# Patient Record
Sex: FEMALE | Race: WHITE | NOT HISPANIC OR LATINO | Employment: FULL TIME | URBAN - METROPOLITAN AREA
[De-identification: names, ages, dates, MRNs, and addresses within clinical notes are randomized per-mention and may not be internally consistent; named-entity substitution may affect disease eponyms.]

---

## 2017-05-19 ENCOUNTER — HOSPITAL ENCOUNTER (OUTPATIENT)
Dept: FAMILY MEDICINE CLINIC | Facility: CLINIC | Age: 67
Setting detail: SPECIMEN
Discharge: HOME | End: 2017-05-19
Attending: FAMILY MEDICINE | Admitting: FAMILY MEDICINE

## 2017-05-19 ENCOUNTER — CONVERSION ENCOUNTER (OUTPATIENT)
Dept: FAMILY MEDICINE CLINIC | Facility: CLINIC | Age: 67
End: 2017-05-19

## 2017-05-19 LAB
ALBUMIN SERPL-MCNC: 4.1 G/DL (ref 3.5–4.8)
ALBUMIN/GLOB SERPL: 1.2 {RATIO} (ref 1–1.7)
ALP SERPL-CCNC: 66 IU/L (ref 32–91)
ALT SERPL-CCNC: 33 IU/L (ref 14–54)
ANION GAP SERPL CALC-SCNC: 13.1 MMOL/L (ref 10–20)
AST SERPL-CCNC: 28 IU/L (ref 15–41)
BILIRUB SERPL-MCNC: 0.5 MG/DL (ref 0.3–1.2)
BUN SERPL-MCNC: 18 MG/DL (ref 8–20)
BUN/CREAT SERPL: 22.5 (ref 5.4–26.2)
CALCIUM SERPL-MCNC: 9.4 MG/DL (ref 8.9–10.3)
CHLORIDE SERPL-SCNC: 103 MMOL/L (ref 101–111)
CHOLEST SERPL-MCNC: 154 MG/DL
CHOLEST/HDLC SERPL: 2.6 {RATIO}
CONV CO2: 27 MMOL/L (ref 22–32)
CONV LDL CHOLESTEROL DIRECT: 77 MG/DL (ref 0–100)
CONV TOTAL PROTEIN: 7.4 G/DL (ref 6.1–7.9)
CREAT UR-MCNC: 0.8 MG/DL (ref 0.4–1)
GLOBULIN UR ELPH-MCNC: 3.3 G/DL (ref 2.5–3.8)
GLUCOSE SERPL-MCNC: 93 MG/DL (ref 65–99)
HDLC SERPL-MCNC: 59 MG/DL
LDLC/HDLC SERPL: 1.3 {RATIO}
LIPID INTERPRETATION: NORMAL
POTASSIUM SERPL-SCNC: 4.1 MMOL/L (ref 3.6–5.1)
SODIUM SERPL-SCNC: 139 MMOL/L (ref 136–144)
TRIGL SERPL-MCNC: 111 MG/DL
VLDLC SERPL CALC-MCNC: 18.1 MG/DL

## 2017-07-20 ENCOUNTER — HOSPITAL ENCOUNTER (OUTPATIENT)
Dept: ONCOLOGY | Facility: CLINIC | Age: 67
Discharge: HOME | End: 2017-07-20
Attending: INTERNAL MEDICINE | Admitting: INTERNAL MEDICINE

## 2017-07-20 LAB
ALBUMIN SERPL-MCNC: 3.9 G/DL (ref 3.5–4.8)
ALBUMIN/GLOB SERPL: 1.1 {RATIO} (ref 1–1.7)
ALP SERPL-CCNC: 69 IU/L (ref 32–91)
ALT SERPL-CCNC: 36 IU/L (ref 14–54)
ANION GAP SERPL CALC-SCNC: 13.7 MMOL/L (ref 10–20)
AST SERPL-CCNC: 34 IU/L (ref 15–41)
BILIRUB SERPL-MCNC: 0.8 MG/DL (ref 0.3–1.2)
BUN SERPL-MCNC: 19 MG/DL (ref 8–20)
BUN/CREAT SERPL: 21.1 (ref 5.4–26.2)
CALCIUM SERPL-MCNC: 9.3 MG/DL (ref 8.9–10.3)
CHLORIDE SERPL-SCNC: 103 MMOL/L (ref 101–111)
CONV CO2: 23 MMOL/L (ref 22–32)
CONV TOTAL PROTEIN: 7.3 G/DL (ref 6.1–7.9)
CREAT UR-MCNC: 0.9 MG/DL (ref 0.4–1)
GLOBULIN UR ELPH-MCNC: 3.4 G/DL (ref 2.5–3.8)
GLUCOSE SERPL-MCNC: 156 MG/DL (ref 65–99)
POTASSIUM SERPL-SCNC: 3.7 MMOL/L (ref 3.6–5.1)
SODIUM SERPL-SCNC: 136 MMOL/L (ref 136–144)

## 2018-01-16 ENCOUNTER — CONVERSION ENCOUNTER (OUTPATIENT)
Dept: FAMILY MEDICINE CLINIC | Facility: CLINIC | Age: 68
End: 2018-01-16

## 2018-01-18 ENCOUNTER — HOSPITAL ENCOUNTER (OUTPATIENT)
Dept: ONCOLOGY | Facility: CLINIC | Age: 68
Setting detail: INFUSION SERIES
Discharge: HOME | End: 2018-01-18
Attending: NURSE PRACTITIONER | Admitting: NURSE PRACTITIONER

## 2018-01-18 ENCOUNTER — CLINICAL SUPPORT (OUTPATIENT)
Dept: ONCOLOGY | Facility: HOSPITAL | Age: 68
End: 2018-01-18

## 2018-01-18 NOTE — PROGRESS NOTES
PATIENTS ONCOLOGY RECORD LOCATED IN [facility]      Subjective     Name:  AYUSH VERMA     Date:  2018  Address:  [address] [place] [address]  Home: [phone]  :  1950 AGE:  67 y.o.        RECORDS OBTAINED:  Patients Oncology Record is located in [facility]

## 2018-05-25 ENCOUNTER — CONVERSION ENCOUNTER (OUTPATIENT)
Dept: FAMILY MEDICINE CLINIC | Facility: CLINIC | Age: 68
End: 2018-05-25

## 2018-06-07 ENCOUNTER — HOSPITAL ENCOUNTER (OUTPATIENT)
Dept: MAMMOGRAPHY | Facility: HOSPITAL | Age: 68
Discharge: HOME | End: 2018-06-07
Attending: NURSE PRACTITIONER | Admitting: NURSE PRACTITIONER

## 2018-06-14 ENCOUNTER — HOSPITAL ENCOUNTER (OUTPATIENT)
Dept: FAMILY MEDICINE CLINIC | Facility: CLINIC | Age: 68
Setting detail: SPECIMEN
Discharge: HOME | End: 2018-06-14
Attending: FAMILY MEDICINE | Admitting: FAMILY MEDICINE

## 2018-06-14 LAB
ALBUMIN SERPL-MCNC: 4 G/DL (ref 3.5–4.8)
ALBUMIN/GLOB SERPL: 1.3 {RATIO} (ref 1–1.7)
ALP SERPL-CCNC: 57 IU/L (ref 32–91)
ALT SERPL-CCNC: 23 IU/L (ref 14–54)
ANION GAP SERPL CALC-SCNC: 11.4 MMOL/L (ref 10–20)
AST SERPL-CCNC: 28 IU/L (ref 15–41)
BILIRUB SERPL-MCNC: 0.6 MG/DL (ref 0.3–1.2)
BUN SERPL-MCNC: 19 MG/DL (ref 8–20)
BUN/CREAT SERPL: 21.1 (ref 5.4–26.2)
CALCIUM SERPL-MCNC: 9.5 MG/DL (ref 8.9–10.3)
CHLORIDE SERPL-SCNC: 104 MMOL/L (ref 101–111)
CHOLEST SERPL-MCNC: 150 MG/DL
CHOLEST/HDLC SERPL: 2.8 {RATIO}
CONV CO2: 28 MMOL/L (ref 22–32)
CONV LDL CHOLESTEROL DIRECT: 75 MG/DL (ref 0–100)
CONV TOTAL PROTEIN: 7.2 G/DL (ref 6.1–7.9)
CREAT UR-MCNC: 0.9 MG/DL (ref 0.4–1)
GLOBULIN UR ELPH-MCNC: 3.2 G/DL (ref 2.5–3.8)
GLUCOSE SERPL-MCNC: 90 MG/DL (ref 65–99)
HDLC SERPL-MCNC: 53 MG/DL
LDLC/HDLC SERPL: 1.4 {RATIO}
LIPID INTERPRETATION: ABNORMAL
POTASSIUM SERPL-SCNC: 4.4 MMOL/L (ref 3.6–5.1)
SODIUM SERPL-SCNC: 139 MMOL/L (ref 136–144)
TRIGL SERPL-MCNC: 146 MG/DL
VLDLC SERPL CALC-MCNC: 22.1 MG/DL

## 2018-07-19 ENCOUNTER — HOSPITAL ENCOUNTER (OUTPATIENT)
Dept: ONCOLOGY | Facility: CLINIC | Age: 68
Setting detail: INFUSION SERIES
Discharge: HOME | End: 2018-07-19
Attending: INTERNAL MEDICINE | Admitting: INTERNAL MEDICINE

## 2018-07-19 ENCOUNTER — CLINICAL SUPPORT (OUTPATIENT)
Dept: ONCOLOGY | Facility: HOSPITAL | Age: 68
End: 2018-07-19

## 2018-07-19 NOTE — PROGRESS NOTES
PATIENTS ONCOLOGY RECORD LOCATED IN [facility]      Subjective     Name:  AYUSH VERMA     Date:  2018  Address:  [address] [place] [id number]  Home: [phone]  :  1950 AGE:  67 y.o.        RECORDS OBTAINED:  Patients Oncology Record is located in [facility]

## 2018-09-21 ENCOUNTER — CONVERSION ENCOUNTER (OUTPATIENT)
Dept: FAMILY MEDICINE CLINIC | Facility: CLINIC | Age: 68
End: 2018-09-21

## 2018-10-19 ENCOUNTER — HOSPITAL ENCOUNTER (OUTPATIENT)
Dept: MRI IMAGING | Facility: HOSPITAL | Age: 68
Discharge: HOME | End: 2018-10-19
Attending: FAMILY MEDICINE | Admitting: FAMILY MEDICINE

## 2018-11-01 ENCOUNTER — HOSPITAL ENCOUNTER (OUTPATIENT)
Dept: PHYSICAL THERAPY | Facility: HOSPITAL | Age: 68
Setting detail: RECURRING SERIES
Discharge: HOME | End: 2018-12-31
Attending: FAMILY MEDICINE | Admitting: FAMILY MEDICINE

## 2018-11-30 ENCOUNTER — CONVERSION ENCOUNTER (OUTPATIENT)
Dept: FAMILY MEDICINE CLINIC | Facility: CLINIC | Age: 68
End: 2018-11-30

## 2019-01-24 ENCOUNTER — HOSPITAL ENCOUNTER (OUTPATIENT)
Dept: ONCOLOGY | Facility: HOSPITAL | Age: 69
Discharge: HOME | End: 2019-01-24
Attending: NURSE PRACTITIONER | Admitting: NURSE PRACTITIONER

## 2019-01-24 ENCOUNTER — CLINICAL SUPPORT (OUTPATIENT)
Dept: ONCOLOGY | Facility: HOSPITAL | Age: 69
End: 2019-01-24

## 2019-01-24 ENCOUNTER — HOSPITAL ENCOUNTER (OUTPATIENT)
Dept: ONCOLOGY | Facility: CLINIC | Age: 69
Setting detail: INFUSION SERIES
Discharge: HOME | End: 2019-01-24
Attending: NURSE PRACTITIONER | Admitting: NURSE PRACTITIONER

## 2019-01-24 LAB
ALBUMIN SERPL-MCNC: 3.8 G/DL (ref 3.5–4.8)
ALBUMIN/GLOB SERPL: 1.2 {RATIO} (ref 1–1.7)
ALP SERPL-CCNC: 57 IU/L (ref 32–91)
ALT SERPL-CCNC: 20 IU/L (ref 14–54)
ANION GAP SERPL CALC-SCNC: 13 MMOL/L (ref 10–20)
AST SERPL-CCNC: 25 IU/L (ref 15–41)
BILIRUB SERPL-MCNC: 0.6 MG/DL (ref 0.3–1.2)
BUN SERPL-MCNC: 17 MG/DL (ref 8–20)
BUN/CREAT SERPL: 21.3 (ref 5.4–26.2)
CALCIUM SERPL-MCNC: 9.1 MG/DL (ref 8.9–10.3)
CHLORIDE SERPL-SCNC: 102 MMOL/L (ref 101–111)
CONV CO2: 26 MMOL/L (ref 22–32)
CONV TOTAL PROTEIN: 6.9 G/DL (ref 6.1–7.9)
CREAT UR-MCNC: 0.8 MG/DL (ref 0.4–1)
GLOBULIN UR ELPH-MCNC: 3.1 G/DL (ref 2.5–3.8)
GLUCOSE SERPL-MCNC: 131 MG/DL (ref 65–99)
POTASSIUM SERPL-SCNC: 4 MMOL/L (ref 3.6–5.1)
SODIUM SERPL-SCNC: 137 MMOL/L (ref 136–144)

## 2019-01-24 NOTE — PROGRESS NOTES
PATIENTS ONCOLOGY RECORD LOCATED IN [facility]      Subjective     Name:  AYUSH VERMA     Date:  2019  Address:  [address] [place] [address] [place] [address]  Home: [unfilled]  :  1950 AGE:  68 y.o.        RECORDS OBTAINED:  Patients Oncology Record is located in [facility]

## 2019-05-20 ENCOUNTER — CONVERSION ENCOUNTER (OUTPATIENT)
Dept: FAMILY MEDICINE CLINIC | Facility: CLINIC | Age: 69
End: 2019-05-20

## 2019-05-20 ENCOUNTER — HOSPITAL ENCOUNTER (OUTPATIENT)
Dept: FAMILY MEDICINE CLINIC | Facility: CLINIC | Age: 69
Setting detail: SPECIMEN
Discharge: HOME | End: 2019-05-20
Attending: FAMILY MEDICINE | Admitting: FAMILY MEDICINE

## 2019-05-20 LAB
AMPICILLIN SUSC ISLT: ABNORMAL
AZTREONAM SUSC ISLT: ABNORMAL
BACTERIA ISLT: ABNORMAL
BACTERIA SPEC AEROBE CULT: ABNORMAL
CEFAZOLIN SUSC ISLT: ABNORMAL
CEFEPIME SUSC ISLT: ABNORMAL
CEFTRIAXONE SUSC ISLT: ABNORMAL
CIPROFLOXACIN SUSC ISLT: ABNORMAL
COLONY COUNT: ABNORMAL
LEVOFLOXACIN SUSC ISLT: ABNORMAL
Lab: ABNORMAL
MEROPENEM SUSC ISLT: ABNORMAL
MICRO REPORT STATUS: ABNORMAL
NITROFURANTOIN SUSC ISLT: ABNORMAL
PIP+TAZO SUSC ISLT: ABNORMAL
SPECIMEN SOURCE: ABNORMAL
SUSC METH SPEC: ABNORMAL
TETRACYCLINE SUSC ISLT: ABNORMAL
TOBRAMYCIN SUSC ISLT: ABNORMAL
TRIMETHOPRIM/SULFA: ABNORMAL

## 2019-05-31 ENCOUNTER — CONVERSION ENCOUNTER (OUTPATIENT)
Dept: FAMILY MEDICINE CLINIC | Facility: CLINIC | Age: 69
End: 2019-05-31

## 2019-05-31 ENCOUNTER — HOSPITAL ENCOUNTER (OUTPATIENT)
Dept: FAMILY MEDICINE CLINIC | Facility: CLINIC | Age: 69
Setting detail: SPECIMEN
Discharge: HOME | End: 2019-05-31
Attending: FAMILY MEDICINE | Admitting: FAMILY MEDICINE

## 2019-05-31 LAB
ALBUMIN SERPL-MCNC: 4 G/DL (ref 3.5–4.8)
ALBUMIN/GLOB SERPL: 1 {RATIO} (ref 1–1.7)
ALP SERPL-CCNC: 71 IU/L (ref 32–91)
ALT SERPL-CCNC: 37 IU/L (ref 14–54)
ANION GAP SERPL CALC-SCNC: 16.6 MMOL/L (ref 10–20)
AST SERPL-CCNC: 23 IU/L (ref 15–41)
BILIRUB SERPL-MCNC: 0.3 MG/DL (ref 0.3–1.2)
BUN SERPL-MCNC: 18 MG/DL (ref 8–20)
BUN/CREAT SERPL: 20 (ref 5.4–26.2)
CALCIUM SERPL-MCNC: 9.3 MG/DL (ref 8.9–10.3)
CHLORIDE SERPL-SCNC: 99 MMOL/L (ref 101–111)
CHOLEST SERPL-MCNC: 147 MG/DL
CHOLEST/HDLC SERPL: 2.4 {RATIO}
CONV CO2: 27 MMOL/L (ref 22–32)
CONV LDL CHOLESTEROL DIRECT: 69 MG/DL (ref 0–100)
CONV TOTAL PROTEIN: 8 G/DL (ref 6.1–7.9)
CREAT UR-MCNC: 0.9 MG/DL (ref 0.4–1)
GLOBULIN UR ELPH-MCNC: 4 G/DL (ref 2.5–3.8)
GLUCOSE SERPL-MCNC: 84 MG/DL (ref 65–99)
HBA1C MFR BLD: 5.9 % (ref 0–5.6)
HDLC SERPL-MCNC: 61 MG/DL
LDLC/HDLC SERPL: 1.1 {RATIO}
LIPID INTERPRETATION: ABNORMAL
POTASSIUM SERPL-SCNC: 3.6 MMOL/L (ref 3.6–5.1)
SODIUM SERPL-SCNC: 139 MMOL/L (ref 136–144)
TRIGL SERPL-MCNC: 169 MG/DL
VLDLC SERPL CALC-MCNC: 17.9 MG/DL

## 2019-06-04 VITALS
OXYGEN SATURATION: 100 % | DIASTOLIC BLOOD PRESSURE: 75 MMHG | OXYGEN SATURATION: 96 % | WEIGHT: 171 LBS | BODY MASS INDEX: 28.46 KG/M2 | HEART RATE: 78 BPM | HEART RATE: 83 BPM | WEIGHT: 178 LBS | DIASTOLIC BLOOD PRESSURE: 72 MMHG | DIASTOLIC BLOOD PRESSURE: 71 MMHG | SYSTOLIC BLOOD PRESSURE: 110 MMHG | BODY MASS INDEX: 26.99 KG/M2 | BODY MASS INDEX: 29.62 KG/M2 | SYSTOLIC BLOOD PRESSURE: 133 MMHG | HEART RATE: 101 BPM | SYSTOLIC BLOOD PRESSURE: 134 MMHG | DIASTOLIC BLOOD PRESSURE: 85 MMHG | OXYGEN SATURATION: 99 % | DIASTOLIC BLOOD PRESSURE: 89 MMHG | HEART RATE: 83 BPM | HEART RATE: 73 BPM | BODY MASS INDEX: 28.79 KG/M2 | SYSTOLIC BLOOD PRESSURE: 114 MMHG | OXYGEN SATURATION: 99 % | HEIGHT: 65 IN | SYSTOLIC BLOOD PRESSURE: 120 MMHG | BODY MASS INDEX: 28.95 KG/M2 | WEIGHT: 162 LBS | WEIGHT: 173 LBS | OXYGEN SATURATION: 99 % | WEIGHT: 174 LBS

## 2019-06-04 VITALS
SYSTOLIC BLOOD PRESSURE: 155 MMHG | OXYGEN SATURATION: 100 % | WEIGHT: 177 LBS | BODY MASS INDEX: 29.45 KG/M2 | HEART RATE: 80 BPM | DIASTOLIC BLOOD PRESSURE: 88 MMHG

## 2019-06-04 VITALS
BODY MASS INDEX: 28.49 KG/M2 | OXYGEN SATURATION: 97 % | HEART RATE: 80 BPM | WEIGHT: 171 LBS | DIASTOLIC BLOOD PRESSURE: 85 MMHG | HEIGHT: 65 IN | SYSTOLIC BLOOD PRESSURE: 133 MMHG

## 2019-06-06 LAB
HPV E6+E7 MRNA CVX QL NAA+PROBE: NOT DETECTED
THIN PREP CVX: NORMAL

## 2019-06-06 NOTE — PROGRESS NOTES
Visit Type:  Acute Visit  Referring Provider:  Regina Gan MD  Primary Provider:  aMlik Tapia MD    CC:  dysuria .    History of Present Illness:         This is a 68 year old woman who presents with dysuria.  The patient complains of burning on urination, frequency, urgency and hematuria, but denies fever, chills and back pain.  No complaints of nausea.  Risk factors for UTI include no significant   risk factors.        Past Medical History:     Reviewed history from 2018 and no changes required:        Arthritis        Breast CA  - Dr. 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                [place] Palsy    Past Surgical History:     Reviewed history from 2015 and no changes required:        bilateral Masectomy-        Neck-        Gallbladder-        Back-    Family History Summary:      Reviewed history Last on 2018 and no changes required:2019  Mother - Has Family History of Hypertension - Entered On: 10/30/2015    General Comments - FH:  FH DM-father  at 74  FH DM-mother  at 81  FH HTN-mother  FH Alzheimers-mother      Social History:     Reviewed history from 2018 and no changes required:        Marital Status: Single        Children: 2        Occupation: , 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        reads, walks, plays piano, knits        6 grandsons        Risk Factors:     Smoked Tobacco Use:  Never smoker  Smokeless Tobacco Use:  Never  Drug use:  No  Caffeine use:  1 drinks per day  Alcohol use:  yes     Type:  occ     Drinks per day:  social  Exercise:  yes     Type of Exercise:  walks on treadmill sometimes  Seatbelt use:  100 %  Sun Exposure:  occasionally    Previous Tobacco Use: Signed On - 2018  Smoked Tobacco Use:  Never smoker  Smokeless Tobacco Use:  Never  Drug use:  No  Caffeine use:  1 drinks per day    Previous Alcohol  Use: Signed On - 2018  Alcohol use:  yes     Type:  occ     Drinks per day:  social  Exercise:  yes     Type of Exercise:  walks on treadmill sometimes  Seatbelt use:  100 %  Sun Exposure:  occasionally    Colonoscopy History:     Date of Last Colonoscopy:  2012      Review of Systems        See HPI      Vital Signs:    Patient Profile:    68 Years Old Female  Height:     65 inches  Weight:     177 pounds  BMI:        29.45     O2 Sat:     100 %  Temp:       97.2 degrees F oral  Pulse rate: 80 / minute  BP Sittin / 88  (left arm)    Cuff size:  regular      Problems: Active problems were reviewed with the patient during this visit.  Medications: Medications were reviewed with the patient during this visit.  Allergies: Allergies were reviewed with the patient during this visit.  No Known Allergy.        Vitals Entered By: Sherrell Hurd CMA (May 20, 2019 11:01 AM)      Physical Exam    General:      well developed, well nourished, in no acute distress.    Neck:      no masses, thyromegaly, or abnormal cervical nodes.    Lungs:      clear bilaterally to auscultation.    Heart:      non-displaced PMI, chest non-tender; regular rate and rhythm, S1, S2 without murmurs, rubs, or gallops  Abdomen:      no CVA tenderness.        Blood Pressure:  Today's BP: 155/88 mm Hg    Labwork:   Most Recent Lab Results:   LDL: 75 mg/dL 2018    Active Medications (reviewed today):  CYCLOBENZAPRINE HCL 10 MG ORAL TABLET (CYCLOBENZAPRINE HCL) Take one (1) tablet by mouth three times a day as needed for back pain  ASPIRIN 81 MG ORAL TABLET CHEWABLE (ASPIRIN) Take 1 tablet by mouth daily  ALPRAZOLAM 0.25 MG ORAL TABLET (ALPRAZOLAM) Take 1 tablet by mouth daily as needed for anxiety.  CALCIUM + D3 600-200 MG-UNIT ORAL TABLET (CALCIUM CARB-CHOLECALCIFEROL) take one tablet by mouth twice a day  CENTRUM SILVER ULTRA WOMENS ORAL TABLET (MULTIPLE VITAMINS-MINERALS) Take one by mouth daily  GABAPENTIN 300 MG CAPSULE  (GABAPENTIN) TAKE 1 TABLET BY MOUTH 3 TIMES A DAY EVERY DAY  NAPROSYN 500 MG ORAL TABLET (NAPROXEN) Take one (1) tablet by mouth twice a day as needed for pain  TAMOXIFEN 20 MG TABLET (TAMOXIFEN CITRATE) TAKE 1 TABLET BY MOUTH EVERY DAY  VITAMIN B-6 100 MG ORAL TABLET (PYRIDOXINE HCL) Take one by mouth daily  VITAMIN B12 TABLET (CYANOCOBALAMIN TABS) 500mg once daily    Current Allergies (reviewed today):  No known allergies        Impression & Recommendations:    Problem # 1:  UTI (ICD-599.0) ([id number]-O33.0)    Her updated medication list for this problem includes:     Bactrim Ds 800-160 Mg Oral Tablet (Sulfamethoxazole-trimethoprim) ..... Take one (1) tablet by mouth twice a day    Orders:  Urinalysis Dip Stick/Tablet Rgnt NON-AUTO W/O Ezequiel ([id number])  [facility] CULTURE,URINE (URNC)    Encouraged to push clear liquids, get enough rest, and take acetaminophen as needed. To be seen in 10 days if no improvement, sooner if worse.      Medications Added to Medication List This Visit:  1)  Bactrim Ds 800-160 Mg Oral Tablet (Sulfamethoxazole-trimethoprim) .... Take one (1) tablet by mouth twice a day  2)  Cetirizine Hcl 10 Mg Oral Tablet (Cetirizine hcl) .... Take 1 tablet by mouth every day  3)  Glucosamine Complex Oral Tablet (Boswellia-glucosamine-vit d)  4)  Gabapentin 300 Mg Capsule (Gabapentin) .... Take 1 tablet by mouth daily prn              Medications:  BACTRIM -160 MG ORAL TABLET (SULFAMETHOXAZOLE-TRIMETHOPRIM) Take one (1) tablet by mouth twice a day  #10[Tablet] x 0      Entered and Authorized by:  Regina Gan MD      Electronically signed by:   Regina Gan MD on 05/20/2019      Method used:    Electronically to               [facility]/pharmacy# [id number]* (retail)              [address] [place] [address]              Ph: [phone]              Fax: [phone]      Note to Pharmacy: Route: ORAL;       RxID:   [id number]                Medication Administration    Orders  Added:  1)  Urinalysis Dip Stick/Tablet Rgnt NON-AUTO W/O Ezequiel [[id number]]  2)  [facility] CULTURE,URINE [URNC]  3)  Ofc Vst, Est Level III [16904]  ]    Date/Time Received: May 20, 2019 11:26 AM)    Routine Urinalysis          Normal Range   Color:      yellow          Color: Yellow   Appearance:  cloudy         Appearance: Clear  Leukocytes:  1+     Leukocytes: Negative   Nitrite:         positive       Nitrite: Negative  Urobilinogen:    0.2 mg/dL      Urobilinogen: Negative mg/dL  Protein:     negative mg/dL     Protein:  Negative mg/dL  pH:      6.5        pH:  4.5-8.0  Blood:       non-hemolyzed trace        Blood:  Negative  Spec. Gravity:   1.015      Spec Gravity:  1.005-1.030  Ketones:     negative mg/dL     Ketones:  Negative mg/dL  Bilirubin:   negative       Bilirubin:  Negative  Glucose:     negative mg/dL     Glucose:  Negative mg/dL                      Electronically signed by Regina Gan MD on 05/20/2019 at 11:42 AM  ________________________________________________________________________       Disclaimer: Converted Note message may not contain all data elements that existed in the legacy source system. Please see [employer] Legacy System for the original note details.

## 2019-06-06 NOTE — PROGRESS NOTES
Visit Type:  Annual Physical  Referring Provider:  Regina Gan MD  Primary Provider:  Malik Tapia MD    CC:  annual GYN examination.    History of Present Illness:         This is a 68 years old female who presents for annual GYN examination.  The patient denies abnormal pap smears, pelvic pain, abnormal vaginal discharge, depression, anxiety, urinary symptoms, chest pain, palpitations, shortness of breath, leg   swelling, back pain, abdominal pain, headaches and bowel problems.  The patient notes that she is not sexually active.  The patient notes that she performs self breast exams has no breast concerns.  Patient is due for Pap Smear.        Past Medical History:     Reviewed history from 2018 and no changes required:        Arthritis        Breast CA  - Dr. 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                [place] Palsy    Past Surgical History:     Reviewed history from 2015 and no changes required:        bilateral Masectomy-        Neck-        Gallbladder-        Back-    Family History Summary:      Reviewed history Last on 2019 and no changes required:2019  Mother - Has Family History of Hypertension - Entered On: 10/30/2015    General Comments - FH:  FH DM-father  at 74  FH DM-mother  at 81  FH HTN-mother  FH Alzheimers-mother      Social History:     Reviewed history from 2018 and no changes required:        Marital Status: Single        Children: 2        Occupation: , 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, walks, plays piano, knits        6 grandsons        Risk Factors:     Smoked Tobacco Use:  Never smoker  Smokeless Tobacco Use:  Never  Drug use:  No  Caffeine use:  1 drinks per day  Alcohol use:  yes     Type:  occ     Drinks per day:  social  Exercise:  yes     Type of Exercise:  walks on treadmill  sometimes  Seatbelt use:  100 %  Sun Exposure:  occasionally    Previous Tobacco Use: Signed On - 2019  Smoked Tobacco Use:  Never smoker  Smokeless Tobacco Use:  Never  Drug use:  No  Caffeine use:  1 drinks per day    Previous Alcohol Use: Signed On 2019  Alcohol use:  yes     Type:  occ     Drinks per day:  social  Exercise:  yes     Type of Exercise:  walks on treadmill sometimes  Seatbelt use:  100 %  Sun Exposure:  occasionally    Colonoscopy History:     Date of Last Colonoscopy:  2012      Review of Systems     Derm       Complains of rash.      Vital Signs:    Patient Profile:    68 Years Old Female  Height:     64.75 inches  Weight:     171 pounds  BMI:        28.67     O2 Sat:     97 %  Temp:       96.6 degrees F oral  Pulse rate: 80 / minute  BP Sittin / 85  (left arm)    Cuff size:  regular      Problems: Active problems were reviewed with the patient during this visit.  Medications: Medications were reviewed with the patient during this visit.  Allergies: Allergies were reviewed with the patient during this visit.  No Known Allergy.        Vitals Entered By: Sherrell Hurd CMA (May 31, 2019 8:54 AM)      Physical Exam    General:      well developed, well nourished, in no acute distress.    Head:      normocephalic and atraumatic.    Eyes:      PERRL/EOM intact, conjunctiva and sclera clear with out nystagmus.    Ears:      TM's intact and clear with normal canals with grossly normal hearing.    Mouth:      no deformity or lesions with good dentition.    Neck:      no masses, thyromegaly, or abnormal cervical nodes.    Lungs:      clear bilaterally to auscultation.    Heart:      non-displaced PMI, chest non-tender; regular rate and rhythm, S1, S2 without murmurs, rubs, or gallops  Abdomen:      no CVA tenderness.    Rectal:      normal external exam.    Genitalia:      Pelvic Exam:         External: normal female genitalia without lesions or masses         Vagina: normal without  lesions or masses         Cervix: normal without lesions or masses         Adnexa: normal bimanual exam without masses or fullness         Uterus: normal by palpation         Pap smear: performed  Msk:      no deformity or scoliosis noted of thoracic or lumbar spine.    Extremities:      no pedal edema.    Neurologic:      CN II-XII gross intact.    Skin:      intact without lesions or rashes.    Cervical Nodes:      no significant adenopathy.    Psych:      alert and cooperative; normal mood and affect; normal attention span and concentration.        Blood Pressure:  Today's BP: 133/85 mm Hg    Labwork:   Most Recent Lab Results:   LDL: 75 mg/dL 06/14/2018    Active Medications (reviewed today):  CETIRIZINE HCL 10 MG ORAL TABLET (CETIRIZINE HCL) TAKE 1 TABLET BY MOUTH EVERY DAY  GLUCOSAMINE COMPLEX ORAL TABLET (BOSWELLIA-GLUCOSAMINE-VIT D)   CYCLOBENZAPRINE HCL 10 MG ORAL TABLET (CYCLOBENZAPRINE HCL) Take one (1) tablet by mouth three times a day as needed for back pain  ALPRAZOLAM 0.25 MG ORAL TABLET (ALPRAZOLAM) Take 1 tablet by mouth daily as needed for anxiety.  CALCIUM + D3 600-200 MG-UNIT ORAL TABLET (CALCIUM CARB-CHOLECALCIFEROL) take one tablet by mouth twice a day  CENTRUM SILVER ULTRA WOMENS ORAL TABLET (MULTIPLE VITAMINS-MINERALS) Take one by mouth daily  GABAPENTIN 300 MG CAPSULE (GABAPENTIN) TAKE 1 TABLET BY MOUTH 3 TIMES A DAY EVERY DAY  NAPROSYN 500 MG ORAL TABLET (NAPROXEN) Take one (1) tablet by mouth twice a day as needed for pain  TAMOXIFEN 20 MG TABLET (TAMOXIFEN CITRATE) TAKE 1 TABLET BY MOUTH EVERY DAY  VITAMIN B-6 100 MG ORAL TABLET (PYRIDOXINE HCL) Take one by mouth daily  VITAMIN B12 TABLET (CYANOCOBALAMIN TABS) 500mg once daily    Current Allergies (reviewed today):  No known allergies        Impression & Recommendations:    Problem # 1:  Preventive health care (ICD-V70.0) (PVE32-S94.00)    Orders:  [facility] HEMOGLOBIN A1c (A1DCA)  [facility] LIPID PANEL (LIPID)  [facility] COMPREHENSIVE METABOLIC PANEL  (CMP) (MPC)  Medicare-Subsequent Year (AWV) ()  [facility] THIN PREP+HPV mRNA E6/E7 30+ (TPHPVR)      Medications Added to Medication List This Visit:  1)  Prednisone 10 Mg (21) Oral Tablet Therapy Pack (Prednisone) .... Take 1 tablet by mouth every day for 7 days                          Medication Administration    Orders Added:  1)  [facility] HEMOGLOBIN A1c [A1DCA]  2)  [facility] LIPID PANEL [LIPID]  3)  [facility] COMPREHENSIVE METABOLIC PANEL (CMP) [MPC]  4)  Medicare-Subsequent Year (AWV) []  5)  [facility] THIN PREP+HPV mRNA E6/E7 30+ [TPHPVR]  ]    Physical Exam   Height:  64.75  Weight:  177  BP:  133/85 mm HG      Risk Factors  Illicit Drug use: No    Past Medical History  Arthritis  Breast CA  - Dr. 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 No  Estrada Index of Garza in Activities of Daily Living  BATHING: Garza  DRESSING: Garza  TOILETING: Garza  TRANSFERRING: Garza  CONTINENCE: Garza  FEEDING: Garza  ADL Score: 6  The patient is dependent.    Home Safety   Do you have adequate housing? yes  Do you have transportation? yes  Do you have sufficient food? yes  Do you have access to a telephone? yes  Actual or threats of physical, emotional abuse? no  Do you have actual or threats of sexual abuse? no  Do you feel safe at home? yes  Home Safety Assessment Needed?   no    Falls Information:   Falls in the past 2 months?  no  Falls in the past 6 months? no    Balance:   Are there difficulties with balance? no  Have you ever experienced lightheadedness/dizziness w/ position changes? no  Do you have episodes of dizziness? no  Have you experienced blackouts? no        PHQ-9 Survey Results     Scoring Results:  Scoring does not suggest diagnosis of Major or Minor Depression.  Total score is: 0.          Preventive Services Checklist    Abdominal Aortic Aneurysm Screening       Provider Comments: Not Needed  Bone Mass Measurement       Provider Comments: Not Needed  Cardiovascular Counseling       Provider Comments: Advised  Cardiovascular Screening       Chol: 150 (06/14/2018 2:45:00 PM)       HDL: 53 (06/14/2018 2:45:00 PM)       LDL: 75 (06/14/2018 2:45:00 PM)       Provider Comments: Ordered  Colorectal Cancer Screening        Colonoscopy            Date: done in 2012 (01/01/2012 9:17:20 AM)            Provider Comments: Not Needed  Diabetes Screening (fasting glucose)       Provider Comments: Ordered  Glaucoma Test       Provider Comments: Advised  Mammogram       Provider Comments: Not Needed  Pap and Pelvic       Pap Smear            Provider Comments: Completed       Pelvic Exam            Provider Comments: Completed       Breast Exam            Provider Comments: Pt. Refused     Obesity Counseling       Provider Comments: Not Needed  Last comprehensive physical       Provider Comments: Completed          Electronically signed by Regina Gan MD on 05/31/2019 at 9:20 AM  ________________________________________________________________________       Disclaimer: Converted Note message may not contain all data elements that existed in the [employer] source system. Please see [employer] for the original note details.

## 2019-06-07 NOTE — PROCEDURES
Pap Paper      Imported By: Erika Trevino 5/31/2019 11:10:27 AM    _____________________________________________________________________    External Attachment:      Type: Image      Comment:  External Document      Electronically signed by Regina Gan MD on 05/31/2019 at 11:48 AM  ________________________________________________________________________       Disclaimer: Converted Note message may not contain all data elements that existed in the legacy source system. Please see [facility] Legacy System for the original note details.

## 2019-07-17 PROBLEM — C50.411 MALIGNANT NEOPLASM OF UPPER-OUTER QUADRANT OF RIGHT BREAST IN FEMALE, ESTROGEN RECEPTOR POSITIVE: Status: ACTIVE | Noted: 2019-07-17

## 2019-07-17 PROBLEM — Z17.0 MALIGNANT NEOPLASM OF UPPER-OUTER QUADRANT OF RIGHT BREAST IN FEMALE, ESTROGEN RECEPTOR POSITIVE (HCC): Status: ACTIVE | Noted: 2019-07-17

## 2019-07-24 PROBLEM — M85.80 OSTEOPENIA: Status: ACTIVE | Noted: 2019-07-24

## 2019-07-24 PROBLEM — G89.29 CHRONIC BACK PAIN: Status: ACTIVE | Noted: 2019-07-24

## 2019-07-24 PROBLEM — M54.9 CHRONIC BACK PAIN: Status: ACTIVE | Noted: 2019-07-24

## 2019-07-24 PROBLEM — Z79.1 NSAID LONG-TERM USE: Status: ACTIVE | Noted: 2019-07-24

## 2019-07-24 NOTE — PROGRESS NOTES
Hematology/Oncology Outpatient Follow Up    PATIENT NAME:Edie Byrnes  :1950  MRN: [id number]  PRIMARY CARE PHYSICIAN: Regina Gan MD  REFERRING PHYSICIAN: Dania Baron MD    Chief Complaint   Patient presents with   • Follow-up     breast cancer        HISTORY OF PRESENT ILLNESS:   1. Stage IIB right upper breast cancer diagnosed in .  • 10/30/14 - She was seen in initial consultation.   • In the summer of  was experiencing abdominal pain and was diagnosed with gallbladder disease.  A CT scan was done on 2011 showing a right breast mass measuring 3 cm in size, which was confirmed on the mammogram and ultrasound.  On 11 she underwent laparoscopic cholecystectomy and right breast biopsy pathology revealed poorly differentiated invasive ductal carcinoma that was estrogen and progesterone receptor positive and HER-2/jovita negative.  ER was greater than 90% and NM was 90%.  HER-2/jovita was 1+ on immunohistochemistry.  On 11 she underwent right breast mastectomy, prophylactic left breast mastectomy and right axillary sentinel lymph node biopsy and axillary lymph node dissection.  Pathology examination revealed invasive ductal carcinoma with tumor measuring 3.2 cm.  It was poorly differentiated surgical margin  negative, one sentinel lymph node was positive with micrometastasis, a second sentinel and 16 additional lymph nodes were negative.  There was no lymphovascular invasion and she was consider to have T2N1A (stage IIB disease).  She was seen in consultation by Dr. MYAH Sawyer MD at the [facility] in [facility] on 11 using adjuvant online risk of recurrence over 10 years was estimated 66% and was thought that hormonal therapy would decrease her risk down to 39% and using adjuvant chemotherapy followed by adjuvant hormonal therapy would decrease the risk down to 27%.  Adjuvant chemotherapy with Adriamycin and Cytoxan was given in a dose dense fraction  every two weeks for four cycles followed by Taxotere given every three weeks for four treatments.  She completed four cycles of AC on 10/27/11 and received the first cycle of Taxotere on 11/17/11. She developed severe mucositis and worsening neuropathy after the first cycle of Taxotere and the dose was reduced by 10%.  Significant hand foot syndrome and mucositis developed after dose #2 and hence that she was changed to Taxol treatments every three weeks for the last two doses.  The first of two doses was given on 12/29/11 and the q. three week doses were chosen so that the patient has less neuropathy compared to the weekly doses.  Her ANC had dropped to 250 after the second Taxotere dose and hence she received Neulasta after 3 and 4 of Taxol.  Last dose of Taxol was given on 01/19/12 and the patient was started on Arimidex 1 mg p.o. daily on 02/08/12.  She had already been on calcium carbonate and vitamin D supplementation and DEXA scan on 02/02/12 had T-score of -1.2.  Infusaport was removed on 02/28/12.  She was having worsening low back pain and sciatica and was thought that Arimidex was possibly causing this and she was changed to Aromasin 25 mg daily on 05/07/12.  MRI of the lumbar spine was done on 05/15/12 revealing no evidence of metastatic disease.  There was probably ganglion cyst around the right acetabulum with benign appearance.  CT of the abdomen and pelvis on 10/10/12 revealed a simple renal cyst without evidence of metastatic disease due to worsening of DEXA scan.  On 02/04/14 with the T-score of -1.1.  A decision was made to change Aromasin to Tamoxifen.  A total of 10 years of hormonal therapy was planned to complete in February 20, 2022.  The patient apparently had borderline hypercalcemia with normal parathyroid hormone in April 2012, which subsequently resolved and has had epidural injections to the spine with limited success.  Her insurance has changed and Dr. Kent was not having insurance  plans and required her to change oncologist and hence she is seeing me today.   • 5/5/16 - WBC 5.9, hemoglobin 13, platelet count 328,000. Patient complains of muscle cramps. Ferrous Sulfate 325 mg p.o. q.h.s. or Tylenol PM prescribed.  Magnesium 1.8 (1.8-2.5). Comprehensive metabolic panel with no significant abnormality.    • 6/2/16 - DEXA scan revealed osteopenia with max T-score of -1.5 in left femoral neck and right trochanter.   • 1/2/18 - Admitted to [facility] in [place], [place] for possible CVA. Diagnosed with Bell’s palsy. Workup ruled out acute CVA. Neurology was consulted. Brain MRI was negative. EKG showed normal sinus rhythm.   • 1/3/18 - Cholesterol panel showed total cholesterol 138 (N), HDL 65 (N), LDL 59 (N) and triglyceride level 71 (N).   • 6/7/18 - DEXA scan with osteopenia with a max T-score of -1.7 in the bilateral hips.  • 5/31/19 Pap smear by Regina Gan MD negative.    Past Medical History:   Diagnosis Date   • Arthritis 2001   • Bell's palsy 01/2018   • Breast cancer, right breast (CMS/HCC) 2011    Stage IIB right upper breast cancer-pathology revealed invasive DCIS   • High blood pressure 2001   • Lower extremity pain    • Neuropathy 2012    following chemotherapy        Past Surgical History:   Procedure Laterality Date   • BACK SURGERY  03/2013    low back surgery   • CERVICAL DISC ARTHROPLASTY  09/2010    repair a disc in her neck    • LAPAROSCOPIC CHOLECYSTECTOMY  07/2011   • MASTECTOMY Bilateral 08/2011         Current Outpatient Medications:   •  ALPRAZolam (XANAX) 0.25 MG tablet, ALPRAZOLAM 0.25 MG TABS, Disp: , Rfl:   •  Boswellia-Glucosamine-Vit D (GLUCOSAMINE COMPLEX) tablet, GLUCOSAMINE COMPLEX TABS, Disp: , Rfl:   •  Calcium Carb-Cholecalciferol (CALCIUM + D3) 600-200 MG-UNIT tablet, CALCIUM + D3 600-200 MG-UNIT TABS, Disp: , Rfl:   •  cyanocobalamin 100 MCG tablet, VITAMIN B12 TABS, Disp: , Rfl:   •  cyclobenzaprine (FLEXERIL) 10 MG tablet, Every 8 (Eight) Hours.,  Disp: , Rfl:   •  gabapentin (NEURONTIN) 300 MG capsule, TAKE 1 TABLET BY MOUTH 3 TIMES A DAY EVERY DAY, Disp: , Rfl: 5  •  Multiple Vitamins-Minerals (CENTRUM SILVER ULTRA WOMENS) tablet, CENTRUM SILVER ULTRA WOMENS TABS, Disp: , Rfl:   •  tamoxifen (NOLVADEX) 20 MG chemo tablet, Take 20 mg by mouth Daily., Disp: , Rfl: 6  •  vitamin B-6 (PYRIDOXINE) 100 MG tablet, VITAMIN B-6 100 MG TABS, Disp: , Rfl:     Allergies   Allergen Reactions   • Bactrim [Sulfamethoxazole-Trimethoprim] Rash   • Meperidine Nausea And Vomiting       Family History   Problem Relation Age of Onset   • No Known Problems Other        Cancer-related family history is not on file.    Social History     Tobacco Use   • Smoking status: Never Smoker   Substance Use Topics   • Alcohol use: Yes     Comment: drinks alcohol occasionally   • Drug use: No       I have reviewed the history of present illness, past medical history, family history, social history, lab results, all notes and other records since the patient was last seen on 7/19/18.    SUBJECTIVE: Patient is here for follow up of right breast cancer. Reports to neuropathy since she had chemo in 2011. Has hot flashes, but tolerable.     TIOT Crane present during office visit.          REVIEW OF SYSTEMS:  Review of Systems   Constitutional: Negative for chills and fever.        Hot flashes, but tolerable.    HENT: Negative for ear pain, mouth sores, nosebleeds and sore throat.    Eyes: Negative for photophobia and visual disturbance.   Respiratory: Negative for wheezing and stridor.    Cardiovascular: Negative for chest pain and palpitations.   Gastrointestinal: Negative for abdominal pain, diarrhea, nausea and vomiting.   Endocrine: Negative for cold intolerance and heat intolerance.   Genitourinary: Negative for dysuria and hematuria.   Musculoskeletal: Negative for joint swelling and neck stiffness.   Skin: Negative for color change and rash.   Neurological: Negative for seizures  "and syncope.        Neuropathy.    Hematological: Negative for adenopathy.        No obvious bleeding   Psychiatric/Behavioral: Negative for agitation, confusion and hallucinations.       OBJECTIVE:    Vitals:    07/25/19 1406   BP: 140/80   Pulse: 81   Resp: 18   Temp: 97.9 °F (36.6 °C)   Weight: 80 kg (176 lb 6.4 oz)   Height: 170.2 cm (67\")   PainSc: 0-No pain       ECOG  (1) Restricted in physically strenuous activity, ambulatory and able to do work of light nature    Physical Exam   Constitutional: She is oriented to person, place, and time. No distress.   HENT:   Head: Normocephalic and atraumatic.   Dental fillings.    Eyes: Conjunctivae and EOM are normal. Right eye exhibits no discharge. Left eye exhibits no discharge. No scleral icterus.   Eyeglasses present.    Neck: Normal range of motion. Neck supple. No thyromegaly present.   Cardiovascular: Normal rate, regular rhythm and normal heart sounds. Exam reveals no gallop and no friction rub.   Pulmonary/Chest: Effort normal. No stridor. No respiratory distress. She has no wheezes.   Abdominal: Soft. Bowel sounds are normal. She exhibits no mass. There is no tenderness. There is no rebound and no guarding.   Musculoskeletal: Normal range of motion. She exhibits no tenderness.   Lymphadenopathy:     She has no cervical adenopathy.   Neurological: She is alert and oriented to person, place, and time. She exhibits normal muscle tone.   Skin: Skin is warm. No rash noted. She is not diaphoretic. No erythema.   Psychiatric: She has a normal mood and affect. Her behavior is normal.   Nursing note and vitals reviewed.      RECENT LABS  WBC   Date Value Ref Range Status   07/25/2019 8.43 3.40 - 10.80 10*3/mm3 Final     RBC   Date Value Ref Range Status   07/25/2019 4.49 3.77 - 5.28 10*6/mm3 Final     Hemoglobin   Date Value Ref Range Status   07/25/2019 12.8 12.0 - 15.9 g/dL Final     Hematocrit   Date Value Ref Range Status   07/25/2019 38.8 34.0 - 46.6 % Final "     MCV   Date Value Ref Range Status   07/25/2019 86.4 79.0 - 97.0 fL Final     MCH   Date Value Ref Range Status   07/25/2019 28.5 26.6 - 33.0 pg Final     MCHC   Date Value Ref Range Status   07/25/2019 33.0 31.5 - 35.7 g/dL Final     RDW   Date Value Ref Range Status   07/25/2019 13.8 12.3 - 15.4 % Final     RDW-SD   Date Value Ref Range Status   07/25/2019 42.6 37.0 - 54.0 fl Final     MPV   Date Value Ref Range Status   07/25/2019 9.6 6.0 - 12.0 fL Final     Platelets   Date Value Ref Range Status   07/25/2019 308 140 - 450 10*3/mm3 Final     Neutrophil %   Date Value Ref Range Status   07/25/2019 57.7 42.7 - 76.0 % Final     Lymphocyte %   Date Value Ref Range Status   07/25/2019 31.1 19.6 - 45.3 % Final     Monocyte %   Date Value Ref Range Status   07/25/2019 7.7 5.0 - 12.0 % Final     Eosinophil %   Date Value Ref Range Status   07/25/2019 3.4 0.3 - 6.2 % Final     Basophil %   Date Value Ref Range Status   07/25/2019 0.1 0.0 - 1.5 % Final     Neutrophils, Absolute   Date Value Ref Range Status   07/25/2019 4.86 1.70 - 7.00 10*3/mm3 Final     Lymphocytes, Absolute   Date Value Ref Range Status   07/25/2019 2.62 0.70 - 3.10 10*3/mm3 Final     Monocytes, Absolute   Date Value Ref Range Status   07/25/2019 0.65 0.10 - 0.90 10*3/mm3 Final     Eosinophils, Absolute   Date Value Ref Range Status   07/25/2019 0.29 0.00 - 0.40 10*3/mm3 Final     Basophils, Absolute   Date Value Ref Range Status   07/25/2019 0.01 0.00 - 0.20 10*3/mm3 Final       Lab Results   Component Value Date    GLUCOSE 84 05/31/2019    BUN 18 05/31/2019    CREATININE 0.9 05/31/2019    BCR 20.0 05/31/2019    K 3.6 05/31/2019    CO2 27 05/31/2019    CALCIUM 9.3 05/31/2019    ALBUMIN 4.0 05/31/2019    LABIL2 1.0 05/31/2019    AST 23 05/31/2019    ALT 37 05/31/2019         Assessment/Plan     Malignant neoplasm of upper-outer quadrant of right breast in female, estrogen receptor positive (CMS/HCC)  - CBC & Differential  - CBC Auto Differential  -  Comprehensive Metabolic Panel    Osteopenia of multiple sites      ASSESSMENT:  The patient claims to be doing well on tamoxifen and is getting occasional hot flashes which are tolerable.  She is concerned about stopping tamoxifen.  She is taking calcium with vitamin D 600 mg p.o. twice daily and is not due for DEXA scan until next year.  She had bilateral mastectomies and hence mammograms are not being performed.  She is aware of her self-examination of the skin area.    PLAN:  Continue Eddie D 600 mg po BID.  Continue Tamoxifen 20 mg po daily.    CMP next visit.    I have reviewed labs results, imaging, vitals, and medications with the patient today. Will follow up in 6 months with NP.    Patient verbalized understanding and is in agreement of the above plan.    I have reviewed and validated the information above.   Sohan Fernandez M.D., F.A.C.P.        Much of the above report is an electronic transcription//translation of the spoken language to printed text using Dragon Software. As such, the subtleties and finesse of the spoken language may permit erroneous, or at times, nonsensical words or phrases to be inadvertently transcribed; thus changes may be made at a later date to rectify these errors.

## 2019-07-25 ENCOUNTER — OFFICE VISIT (OUTPATIENT)
Dept: ONCOLOGY | Facility: CLINIC | Age: 69
End: 2019-07-25

## 2019-07-25 ENCOUNTER — APPOINTMENT (OUTPATIENT)
Dept: LAB | Facility: HOSPITAL | Age: 69
End: 2019-07-25

## 2019-07-25 VITALS
HEART RATE: 81 BPM | BODY MASS INDEX: 27.69 KG/M2 | WEIGHT: 176.4 LBS | DIASTOLIC BLOOD PRESSURE: 80 MMHG | TEMPERATURE: 97.9 F | SYSTOLIC BLOOD PRESSURE: 140 MMHG | RESPIRATION RATE: 18 BRPM | HEIGHT: 67 IN

## 2019-07-25 DIAGNOSIS — M85.89 OSTEOPENIA OF MULTIPLE SITES: ICD-10-CM

## 2019-07-25 DIAGNOSIS — C50.911 MALIGNANT NEOPLASM OF RIGHT FEMALE BREAST, UNSPECIFIED ESTROGEN RECEPTOR STATUS, UNSPECIFIED SITE OF BREAST (HCC): Primary | ICD-10-CM

## 2019-07-25 DIAGNOSIS — C50.411 MALIGNANT NEOPLASM OF UPPER-OUTER QUADRANT OF RIGHT BREAST IN FEMALE, ESTROGEN RECEPTOR POSITIVE (HCC): Primary | ICD-10-CM

## 2019-07-25 DIAGNOSIS — Z17.0 MALIGNANT NEOPLASM OF UPPER-OUTER QUADRANT OF RIGHT BREAST IN FEMALE, ESTROGEN RECEPTOR POSITIVE (HCC): Primary | ICD-10-CM

## 2019-07-25 LAB
BASOPHILS # BLD AUTO: 0.01 10*3/MM3 (ref 0–0.2)
BASOPHILS NFR BLD AUTO: 0.1 % (ref 0–1.5)
DEPRECATED RDW RBC AUTO: 42.6 FL (ref 37–54)
EOSINOPHIL # BLD AUTO: 0.29 10*3/MM3 (ref 0–0.4)
EOSINOPHIL NFR BLD AUTO: 3.4 % (ref 0.3–6.2)
ERYTHROCYTE [DISTWIDTH] IN BLOOD BY AUTOMATED COUNT: 13.8 % (ref 12.3–15.4)
HCT VFR BLD AUTO: 38.8 % (ref 34–46.6)
HGB BLD-MCNC: 12.8 G/DL (ref 12–15.9)
LYMPHOCYTES # BLD AUTO: 2.62 10*3/MM3 (ref 0.7–3.1)
LYMPHOCYTES NFR BLD AUTO: 31.1 % (ref 19.6–45.3)
MCH RBC QN AUTO: 28.5 PG (ref 26.6–33)
MCHC RBC AUTO-ENTMCNC: 33 G/DL (ref 31.5–35.7)
MCV RBC AUTO: 86.4 FL (ref 79–97)
MONOCYTES # BLD AUTO: 0.65 10*3/MM3 (ref 0.1–0.9)
MONOCYTES NFR BLD AUTO: 7.7 % (ref 5–12)
NEUTROPHILS # BLD AUTO: 4.86 10*3/MM3 (ref 1.7–7)
NEUTROPHILS NFR BLD AUTO: 57.7 % (ref 42.7–76)
PLATELET # BLD AUTO: 308 10*3/MM3 (ref 140–450)
PMV BLD AUTO: 9.6 FL (ref 6–12)
RBC # BLD AUTO: 4.49 10*6/MM3 (ref 3.77–5.28)
WBC NRBC COR # BLD: 8.43 10*3/MM3 (ref 3.4–10.8)

## 2019-07-25 PROCEDURE — 99213 OFFICE O/P EST LOW 20 MIN: CPT | Performed by: INTERNAL MEDICINE

## 2019-07-25 PROCEDURE — 36415 COLL VENOUS BLD VENIPUNCTURE: CPT | Performed by: INTERNAL MEDICINE

## 2019-07-25 PROCEDURE — 85025 COMPLETE CBC W/AUTO DIFF WBC: CPT | Performed by: INTERNAL MEDICINE

## 2019-07-25 RX ORDER — CYCLOBENZAPRINE HCL 10 MG
TABLET ORAL EVERY 8 HOURS
COMMUNITY
Start: 2019-03-01 | End: 2021-11-22

## 2019-07-25 RX ORDER — GABAPENTIN 300 MG/1
CAPSULE ORAL
Refills: 5 | COMMUNITY
Start: 2019-06-29 | End: 2019-12-06 | Stop reason: SDUPTHER

## 2019-07-25 RX ORDER — MULTIVIT,IRON,MINERALS/LUTEIN
TABLET ORAL
COMMUNITY
Start: 2015-05-01

## 2019-07-25 RX ORDER — ALPRAZOLAM 0.25 MG/1
TABLET ORAL
COMMUNITY
Start: 2015-05-01 | End: 2020-06-01 | Stop reason: SDUPTHER

## 2019-07-25 RX ORDER — TAMOXIFEN CITRATE 20 MG/1
20 TABLET ORAL DAILY
Refills: 6 | COMMUNITY
Start: 2019-06-29 | End: 2019-10-04 | Stop reason: SDUPTHER

## 2019-07-25 RX ORDER — MULTIVITAMIN WITH IRON
TABLET ORAL
COMMUNITY
Start: 2015-05-01

## 2019-10-04 RX ORDER — TAMOXIFEN CITRATE 20 MG/1
TABLET ORAL
Qty: 30 TABLET | Refills: 6 | Status: SHIPPED | OUTPATIENT
Start: 2019-10-04 | End: 2020-05-04

## 2019-12-06 RX ORDER — GABAPENTIN 300 MG/1
CAPSULE ORAL
Qty: 90 CAPSULE | Refills: 5 | Status: SHIPPED | OUTPATIENT
Start: 2019-12-06 | End: 2020-06-08

## 2020-01-20 ENCOUNTER — TELEPHONE (OUTPATIENT)
Dept: ONCOLOGY | Facility: CLINIC | Age: 70
End: 2020-01-20

## 2020-01-30 ENCOUNTER — TRANSCRIBE ORDERS (OUTPATIENT)
Dept: ADMINISTRATIVE | Facility: HOSPITAL | Age: 70
End: 2020-01-30

## 2020-01-30 DIAGNOSIS — Z78.0 OSTEOPENIA AFTER MENOPAUSE: Primary | ICD-10-CM

## 2020-01-30 DIAGNOSIS — M85.80 OSTEOPENIA AFTER MENOPAUSE: Primary | ICD-10-CM

## 2020-02-24 ENCOUNTER — OFFICE VISIT (OUTPATIENT)
Dept: FAMILY MEDICINE CLINIC | Facility: CLINIC | Age: 70
End: 2020-02-24

## 2020-02-24 VITALS
HEART RATE: 88 BPM | RESPIRATION RATE: 16 BRPM | WEIGHT: 178 LBS | BODY MASS INDEX: 27.94 KG/M2 | HEIGHT: 67 IN | TEMPERATURE: 98.3 F | DIASTOLIC BLOOD PRESSURE: 83 MMHG | SYSTOLIC BLOOD PRESSURE: 134 MMHG | OXYGEN SATURATION: 96 %

## 2020-02-24 DIAGNOSIS — L03.113 CELLULITIS OF RIGHT HAND: Primary | ICD-10-CM

## 2020-02-24 PROCEDURE — 99213 OFFICE O/P EST LOW 20 MIN: CPT | Performed by: FAMILY MEDICINE

## 2020-02-24 RX ORDER — CEPHALEXIN 500 MG/1
CAPSULE ORAL
COMMUNITY
Start: 2020-02-22 | End: 2020-06-01

## 2020-02-24 NOTE — PATIENT INSTRUCTIONS
Cellulitis, Adult    Cellulitis is a skin infection. The infected area is often warm, red, swollen, and sore. It occurs most often in the arms and lower legs. It is very important to get treated for this condition.  What are the causes?  This condition is caused by bacteria. The bacteria enter through a break in the skin, such as a cut, burn, insect bite, open sore, or crack.  What increases the risk?  This condition is more likely to occur in people who:  · Have a weak body defense system (immune system).  · Have open cuts, burns, bites, or scrapes on the skin.  · Are older than 60 years of age.  · Have a blood sugar problem (diabetes).  · Have a long-lasting (chronic) liver disease (cirrhosis) or kidney disease.  · Are very overweight (obese).  · Have a skin problem, such as:  ? Itchy rash (eczema).  ? Slow movement of blood in the veins (venous stasis).  ? Fluid buildup below the skin (edema).  · Have been treated with high-energy rays (radiation).  · Use IV drugs.  What are the signs or symptoms?  Symptoms of this condition include:  · Skin that is:  ? Red.  ? Streaking.  ? Spotting.  ? Swollen.  ? Sore or painful when you touch it.  ? Warm.  · A fever.  · Chills.  · Blisters.  How is this diagnosed?  This condition is diagnosed based on:  · Medical history.  · Physical exam.  · Blood tests.  · Imaging tests.  How is this treated?  Treatment for this condition may include:  · Medicines to treat infections or allergies.  · Home care, such as:  ? Rest.  ? Placing cold or warm cloths (compresses) on the skin.  · Hospital care, if the condition is very bad.  Follow these instructions at home:  Medicines  · Take over-the-counter and prescription medicines only as told by your doctor.  · If you were prescribed an antibiotic medicine, take it as told by your doctor. Do not stop taking it even if you start to feel better.  General instructions    · Drink enough fluid to keep your pee (urine) pale yellow.  · Do not touch  or rub the infected area.  · Raise (elevate) the infected area above the level of your heart while you are sitting or lying down.  · Place cold or warm cloths on the area as told by your doctor.  · Keep all follow-up visits as told by your doctor. This is important.  Contact a doctor if:  · You have a fever.  · You do not start to get better after 1-2 days of treatment.  · Your bone or joint under the infected area starts to hurt after the skin has healed.  · Your infection comes back. This can happen in the same area or another area.  · You have a swollen bump in the area.  · You have new symptoms.  · You feel ill and have muscle aches and pains.  Get help right away if:  · Your symptoms get worse.  · You feel very sleepy.  · You throw up (vomit) or have watery poop (diarrhea) for a long time.  · You see red streaks coming from the area.  · Your red area gets larger.  · Your red area turns dark in color.  These symptoms may represent a serious problem that is an emergency. Do not wait to see if the symptoms will go away. Get medical help right away. Call your local emergency services (911 in the [place] Do not drive yourself to the hospital.  Summary  · Cellulitis is a skin infection. The area is often warm, red, swollen, and sore.  · This condition is treated with medicines, rest, and cold and warm cloths.  · Take all medicines only as told by your doctor.  · Tell your doctor if symptoms do not start to get better after 1-2 days of treatment.  This information is not intended to replace advice given to you by your health care provider. Make sure you discuss any questions you have with your health care provider.  Document Released: 06/05/2009 Document Revised: 05/09/2019 Document Reviewed: 05/09/2019  [employer] Interactive Patient Education © 2020 [employer]

## 2020-02-24 NOTE — PROGRESS NOTES
Subjective   Chief Complaint   Patient presents with   • ER Follow up--[facility]     Edie Byrnes is a 69 y.o. female.     Last week she was at work and started feeling chilled and lightheaded.  She was sent home from work.  Later she developed pain and redness and swelling of her right hand and wrist.  It spread up her arm and she went to the ER.  She was ruled out a DVT and started her on Keflex.  She is feeling some better.     Rash   This is a new problem. The current episode started in the past 7 days. The problem has been gradually improving since onset. The affected locations include the right fingers, right wrist, right hand and right arm. The rash is characterized by redness. She was exposed to nothing. Pertinent negatives include no cough, fatigue, fever, shortness of breath or sore throat. Past treatments include antibiotics. The treatment provided moderate relief.      Past Medical History:   Diagnosis Date   • Arthritis 2001   • Bell's palsy 01/2018   • Breast cancer, right breast (CMS/[facility]) 2011    Stage IIB right upper breast cancer-pathology revealed invasive DCIS   • High blood pressure 2001   • Lower extremity pain    • Neuropathy 2012    following chemotherapy      Past Surgical History:   Procedure Laterality Date   • BACK SURGERY  03/2013    low back surgery   • CERVICAL DISC ARTHROPLASTY  09/2010    repair a disc in her neck    • COLONOSCOPY  2012   • LAPAROSCOPIC CHOLECYSTECTOMY  07/2011   • MASTECTOMY Bilateral 08/2011     Allergies   Allergen Reactions   • Bactrim [Sulfamethoxazole-Trimethoprim] Rash   • Meperidine Nausea And Vomiting     Social History     Socioeconomic History   • Marital status: Single     Spouse name: Not on file   • Number of children: Not on file   • Years of education: Not on file   • Highest education level: Not on file   Tobacco Use   • Smoking status: Never Smoker   • Smokeless tobacco: Never Used   Substance and Sexual Activity   • Alcohol use: Yes     Comment:  drinks alcohol occasionally   • Drug use: No     Social History     Tobacco Use   Smoking Status Never Smoker   Smokeless Tobacco Never Used       family history includes No Known Problems in an other family member.  Current Outpatient Medications on File Prior to Visit   Medication Sig Dispense Refill   • ALPRAZolam (XANAX) 0.25 MG tablet ALPRAZOLAM 0.25 MG TABS     • Boswellia-Glucosamine-Vit D (GLUCOSAMINE COMPLEX) tablet GLUCOSAMINE COMPLEX TABS     • Calcium Carb-Cholecalciferol (CALCIUM + D3) 600-200 MG-UNIT tablet CALCIUM + D3 600-200 MG-UNIT TABS     • cephalexin (KEFLEX) 500 MG capsule      • cyanocobalamin 100 MCG tablet VITAMIN B12 TABS     • cyclobenzaprine (FLEXERIL) 10 MG tablet Every 8 (Eight) Hours.     • gabapentin (NEURONTIN) 300 MG capsule TAKE 1 TABLET BY MOUTH 3 TIMES A DAY EVERY DAY 90 capsule 5   • Multiple Vitamins-Minerals (CENTRUM SILVER ULTRA WOMENS) tablet CENTRUM SILVER ULTRA WOMENS TABS     • tamoxifen (NOLVADEX) 20 MG chemo tablet TAKE 1 TABLET BY MOUTH EVERY DAY 30 tablet 6   • vitamin B-6 (PYRIDOXINE) 100 MG tablet VITAMIN B-6 100 MG TABS       No current facility-administered medications on file prior to visit.      Patient Active Problem List   Diagnosis   • Malignant neoplasm of upper-outer quadrant of right breast in female, estrogen receptor positive (CMS/HCC)   • Osteopenia   • Chronic NSAID use   • Chronic back pain   • Cellulitis of right hand       The following portions of the patient's history were reviewed and updated as appropriate: allergies, current medications, past family history, past medical history, past social history, past surgical history and problem list.    Review of Systems   Constitutional: Negative for chills, fatigue and fever.   HENT: Negative for sinus pressure and sore throat.    Eyes: Negative for blurred vision.   Respiratory: Negative for cough and shortness of breath.    Cardiovascular: Negative for chest pain and palpitations.   Gastrointestinal:  "Negative for abdominal pain.   Endocrine: Negative for polyuria.   Skin: Positive for rash.   Neurological: Negative for dizziness and headache.   Hematological: Negative for adenopathy.   Psychiatric/Behavioral: Negative for depressed mood.       Objective   /83 (BP Location: Left arm, Patient Position: Sitting, Cuff Size: Adult)   Pulse 88   Temp 98.3 °F (36.8 °C) (Oral)   Resp 16   Ht 170.2 cm (67\")   Wt 80.7 kg (178 lb)   SpO2 96%   BMI 27.88 kg/m²   Physical Exam   Constitutional: She is oriented to person, place, and time. She appears well-developed. No distress.   HENT:   Head: Normocephalic.   Eyes: Conjunctivae and lids are normal.   Neck: Trachea normal. No thyroid mass and no thyromegaly present.   Cardiovascular: Normal rate, regular rhythm and normal heart sounds.   Pulmonary/Chest: Effort normal and breath sounds normal.   Lymphadenopathy:     She has no cervical adenopathy.   Neurological: She is alert and oriented to person, place, and time.   Skin: Skin is warm and dry. There is erythema.   Mild erythema of right dorsal hand   Psychiatric: She has a normal mood and affect. Her speech is normal and behavior is normal. She is attentive.       No visits with results within 1 Week(s) from this visit.   Latest known visit with results is:   Office Visit on 07/25/2019   Component Date Value Ref Range Status   • WBC 07/25/2019 8.43  3.40 - 10.80 10*3/mm3 Final   • RBC 07/25/2019 4.49  3.77 - 5.28 10*6/mm3 Final   • Hemoglobin 07/25/2019 12.8  12.0 - 15.9 g/dL Final   • Hematocrit 07/25/2019 38.8  34.0 - 46.6 % Final   • MCV 07/25/2019 86.4  79.0 - 97.0 fL Final   • MCH 07/25/2019 28.5  26.6 - 33.0 pg Final   • MCHC 07/25/2019 33.0  31.5 - 35.7 g/dL Final   • RDW 07/25/2019 13.8  12.3 - 15.4 % Final   • RDW-SD 07/25/2019 42.6  37.0 - 54.0 fl Final   • MPV 07/25/2019 9.6  6.0 - 12.0 fL Final   • Platelets 07/25/2019 308  140 - 450 10*3/mm3 Final   • Neutrophil % 07/25/2019 57.7  42.7 - 76.0 % " Final   • Lymphocyte % 07/25/2019 31.1  19.6 - 45.3 % Final   • Monocyte % 07/25/2019 7.7  5.0 - 12.0 % Final   • Eosinophil % 07/25/2019 3.4  0.3 - 6.2 % Final   • Basophil % 07/25/2019 0.1  0.0 - 1.5 % Final   • Neutrophils, Absolute 07/25/2019 4.86  1.70 - 7.00 10*3/mm3 Final   • Lymphocytes, Absolute 07/25/2019 2.62  0.70 - 3.10 10*3/mm3 Final   • Monocytes, Absolute 07/25/2019 0.65  0.10 - 0.90 10*3/mm3 Final   • Eosinophils, Absolute 07/25/2019 0.29  0.00 - 0.40 10*3/mm3 Final   • Basophils, Absolute 07/25/2019 0.01  0.00 - 0.20 10*3/mm3 Final           Assessment/Plan   Problems Addressed this Visit        Other    Cellulitis of right hand - Primary     Improving.  Finish abx as prescribed.  OK to return to work.

## 2020-02-26 NOTE — ASSESSMENT & PLAN NOTE
Improving.  Finish abx as prescribed.  OK to return to work.    SUMMARY:HPI:  This is a 73yo M from [facility] with PMHx of HTN and Parkinson's with b/l DBS who was transferred from OSH due to ICH on CT head. Pt's last known normal was yesterday evening. Pt fell back and out of chair yesterday while out [place] a beach. Pt had no LOC, and no issues after. Went to sleep as normal, but about 2AM family reports that pt didn't seem like himself. Family tried waking him up at 7AM and reports he was unarousable. Pt was taken to [facility] where he received a CT head non-contrast which showed Grade 3 ICH with significant intraventricular blood and hydrocephalus. Pt was transferred to [facility]. In the ED, pt's GCS was 6T. EVD was placed and pt was found to have high opening pressure at about [id number]     ADMISSION SCORES:   GCS: 8 T    ICH score 3:    Allergies    No Known Allergies    Intolerances        REVIEW OF SYSTEMS: [X ] Unable to Assess due to neurologic condition     Neuro: [ ] Headache [ ] Back pain [ ] Numbness [ ] Weakness [ ] Ataxia [ ] Dizziness [ ] Aphasia [ ] Dysarthria [ ] Visual disturbance  Resp: [ ] Shortness of breath/dyspnea, [ ] Orthopnea [ ] Cough  CV: [ ] Chest pain [ ] Palpitation [ ] Lightheadedness [ ] Syncope  Renal: [ ] Thirst [ ] Edema  GI: [ ] Nausea [ ] Emesis [ ] Abdominal pain [ ] Constipation [ ] Diarrhea  Hem: [ ] Hematemesis [ ] bright red blood per rectum  ID: [ ] Fever [ ] Chills [ ] Dysuria  ENT: [ ] Rhinorrhea    DEVICES:   [ X] Restraints x[ ] ET tube [ ] central line [ X] arterial line [ X] baker [X ] NGT/OGT [X ] EVD- 9/1/17      VITALS: [ X] Reviewed  Vital Signs Last 24 Hrs  T(C): 38.7 (01 Sep 2017 16:22), Max: 38.7 (01 Sep 2017 16:22)  T(F): 101.7 (01 Sep 2017 16:22), Max: 101.7 (01 Sep 2017 16:22)  HR: 74 (01 Sep 2017 17:30) (74 - 178)  BP: 119/90 (01 Sep 2017 17:30) (103/77 - 173/101)  BP(mean): 88 (01 Sep 2017 17:15) (84 - 88)  RR: 21 (01 Sep 2017 17:30) (21 - 29)  SpO2: 97% (01 Sep 2017 17:30) (91% - 100%)  CAPILLARY BLOOD GLUCOSE        Mode: AC/ CMV (Assist Control/ Continuous Mandatory Ventilation)  RR (machine): 18  TV (machine): 500  FiO2: 60  PEEP: 5  ITime: 1  MAP: 7  PIP: 12      LABS:    09-01    143  |  104  |  19  ----------------------------<  161<H>  4.4   |  23  |  1.04    Ca    8.8      01 Sep 2017 14:27    TPro  7.8  /  Alb  4.5  /  TBili  0.7  /  DBili  x   /  AST  25  /  ALT  20  /  AlkPhos  76  09-01                          18.1   17.6  )-----------( 171      ( 01 Sep 2017 14:27 )             54.1       STROKE CORE MEASURES: HGBA1C and Lipid profile         IMAGING/DATA: [ X ] Reviewed- L thalamic 33 cc heme with IVH and mass effect with midbrain ext    IVF FLUIDS/MEDICATIONS: NS at 70 cc/hr   MEDICATIONS  (STANDING):  propofol Infusion 5 MICROgram(s)/kG/Min (1.809 mL/Hr) IV Continuous <Continuous>  sodium chloride 0.9% with potassium chloride 20 mEq/L 1000 milliLiter(s) (75 mL/Hr) IV Continuous <Continuous>  docusate sodium 100 milliGRAM(s) Oral three times a day  levETIRAcetam  IVPB 500 milliGRAM(s) IV Intermittent every 12 hours  chlorhexidine 0.12% Liquid 15 milliLiter(s) Swish and Spit two times a day  niCARdipine Infusion 5 mG/Hr (25 mL/Hr) IV Continuous <Continuous>    MEDICATIONS  (PRN):  acetaminophen    Suspension 905 milliGRAM(s) Oral every 6 hours PRN For Temp greater than 38 C (100.4 F)  acetaminophen    Suspension. 650 milliGRAM(s) Oral every 6 hours PRN Mild Pain (1 - 3)  ondansetron Injectable 4 milliGRAM(s) IV Push every 6 hours PRN Nausea and/or Vomiting  senna 2 Tablet(s) Oral at bedtime PRN Constipation  acetaminophen  IVPB. 1000 milliGRAM(s) IV Intermittent once PRN Mild Pain (1 - 3)    I&O's Summary    01 Sep 2017 07:01  -  01 Sep 2017 17:36  --------------------------------------------------------  IN: 5 mL / OUT: 0 mL / NET: 5 mL        EXAMINATION:  PHYSICAL EXAM:    Constitutional: Sedated     Neurological:Pt with eye opening to noxious stimuli, intuubated ; sedation propofol ; pupils 1 MM NR ; L eye 1 mm sluggish ; no regard to threat R eye yet regards to threat  L eye; dolls ; roving eye movements ; cougha nd gag prseent      Motor exam:          Upper extremity                         Delt     Bicep     Tricep    HG                                                 R         Ext posturing to noxious stimuli                                               L          Elevates distal LUE to noxious           Lower extremity                        HF         KF        KE       DF         PF                                                  R          Extensor and rigid to passive movemnet                                                L           Rigid to passive movement                                                  Sensation: No grimacing to noxious      Pulmonary: Cracles at bases      Cardiovascular: S1, S2, Regular rate and rhythm     Gastrointestinal: Soft, Non-tender, Non-distended       EXT - DP - 2+ B/L

## 2020-05-04 RX ORDER — TAMOXIFEN CITRATE 20 MG/1
TABLET ORAL
Qty: 30 TABLET | Refills: 6 | Status: SHIPPED | OUTPATIENT
Start: 2020-05-04 | End: 2020-11-02

## 2020-06-01 ENCOUNTER — OFFICE VISIT (OUTPATIENT)
Dept: FAMILY MEDICINE CLINIC | Facility: CLINIC | Age: 70
End: 2020-06-01

## 2020-06-01 VITALS
SYSTOLIC BLOOD PRESSURE: 157 MMHG | HEART RATE: 99 BPM | DIASTOLIC BLOOD PRESSURE: 89 MMHG | WEIGHT: 179 LBS | OXYGEN SATURATION: 96 % | BODY MASS INDEX: 28.04 KG/M2 | TEMPERATURE: 97.8 F

## 2020-06-01 DIAGNOSIS — I10 ESSENTIAL HYPERTENSION: Primary | ICD-10-CM

## 2020-06-01 DIAGNOSIS — F41.9 ANXIETY: ICD-10-CM

## 2020-06-01 PROCEDURE — 99213 OFFICE O/P EST LOW 20 MIN: CPT | Performed by: FAMILY MEDICINE

## 2020-06-01 RX ORDER — ALPRAZOLAM 0.25 MG/1
0.25 TABLET ORAL DAILY PRN
Qty: 30 TABLET | Refills: 0 | Status: SHIPPED | OUTPATIENT
Start: 2020-06-01 | End: 2021-09-28 | Stop reason: SDUPTHER

## 2020-06-01 RX ORDER — LISINOPRIL 10 MG/1
10 TABLET ORAL DAILY
Qty: 90 TABLET | Refills: 1 | Status: SHIPPED | OUTPATIENT
Start: 2020-06-01 | End: 2020-09-01

## 2020-06-01 NOTE — ASSESSMENT & PLAN NOTE
Hypertension is newly identified.  Dietary sodium restriction.  Weight loss.  Regular aerobic exercise.  Medication changes per orders.  Blood pressure will be reassessed in 4 weeks.

## 2020-06-01 NOTE — PROGRESS NOTES
Subjective   Chief Complaint   Patient presents with   • Hypertension     been running high     Edieselena Byrnes is a 69 y.o. female.     Hypertension   This is a new problem. The current episode started 1 to 4 weeks ago. The problem has been gradually worsening since onset. The problem is uncontrolled. Associated symptoms include anxiety, blurred vision and headaches. Pertinent negatives include no chest pain, palpitations, peripheral edema or shortness of breath. There are no associated agents to hypertension. Past treatments include ACE inhibitors. Current antihypertension treatment includes nothing. There are no compliance problems.    Anxiety   Presents for follow-up visit. Symptoms include excessive worry and nervous/anxious behavior. Patient reports no chest pain, depressed mood, dizziness, palpitations or shortness of breath. Symptoms occur occasionally. The severity of symptoms is moderate. Nighttime awakenings: occasional.          Past Medical History:   Diagnosis Date   • Arthritis 2001   • Bell's palsy 01/2018   • Breast cancer, right breast (CMS/[facility]) 2011    Stage IIB right upper breast cancer-pathology revealed invasive DCIS   • High blood pressure 2001   • Lower extremity pain    • Neuropathy 2012    following chemotherapy      Past Surgical History:   Procedure Laterality Date   • BACK SURGERY  03/2013    low back surgery   • CERVICAL DISC ARTHROPLASTY  09/2010    repair a disc in her neck    • COLONOSCOPY  2012   • LAPAROSCOPIC CHOLECYSTECTOMY  07/2011   • MASTECTOMY Bilateral 08/2011     Allergies   Allergen Reactions   • Bactrim [Sulfamethoxazole-Trimethoprim] Rash   • Meperidine Nausea And Vomiting     Social History     Socioeconomic History   • Marital status: Single     Spouse name: Not on file   • Number of children: Not on file   • Years of education: Not on file   • Highest education level: Not on file   Tobacco Use   • Smoking status: Never Smoker   • Smokeless tobacco: Never Used    Substance and Sexual Activity   • Alcohol use: Yes     Comment: drinks alcohol occasionally   • Drug use: No     Social History     Tobacco Use   Smoking Status Never Smoker   Smokeless Tobacco Never Used       family history includes No Known Problems in an other family member.  Current Outpatient Medications on File Prior to Visit   Medication Sig Dispense Refill   • [place]-Glucosamine-Vit D (GLUCOSAMINE COMPLEX) tablet GLUCOSAMINE COMPLEX TABS     • Calcium Carb-Cholecalciferol (CALCIUM + D3) 600-200 MG-UNIT tablet CALCIUM + D3 600-200 MG-UNIT TABS     • cyanocobalamin 100 MCG tablet VITAMIN B12 TABS     • cyclobenzaprine (FLEXERIL) 10 MG tablet Every 8 (Eight) Hours.     • gabapentin (NEURONTIN) 300 MG capsule TAKE 1 TABLET BY MOUTH 3 TIMES A DAY EVERY DAY 90 capsule 5   • Multiple Vitamins-Minerals (CENTRUM SILVER ULTRA WOMENS) tablet CENTRUM SILVER ULTRA WOMENS TABS     • tamoxifen (NOLVADEX) 20 MG chemo tablet TAKE 1 TABLET BY MOUTH EVERY DAY 30 tablet 6   • vitamin B-6 (PYRIDOXINE) 100 MG tablet VITAMIN B-6 100 MG TABS     • [DISCONTINUED] ALPRAZolam (XANAX) 0.25 MG tablet ALPRAZOLAM 0.25 MG TABS     • [DISCONTINUED] cephalexin (KEFLEX) 500 MG capsule        No current facility-administered medications on file prior to visit.      Patient Active Problem List   Diagnosis   • Malignant neoplasm of upper-outer quadrant of right breast in female, estrogen receptor positive (CMS/HCC)   • Osteopenia   • Chronic NSAID use   • Chronic back pain   • Cellulitis of right hand   • Essential hypertension   • Anxiety       The following portions of the patient's history were reviewed and updated as appropriate: allergies, current medications, past family history, past medical history, past social history, past surgical history and problem list.    Review of Systems   Constitutional: Negative for chills and fever.   HENT: Negative for sinus pressure and sore throat.    Eyes: Positive for blurred vision.   Respiratory:  Negative for cough and shortness of breath.    Cardiovascular: Negative for chest pain and palpitations.   Gastrointestinal: Negative for abdominal pain.   Endocrine: Negative for polyuria.   Skin: Negative for rash.   Neurological: Negative for dizziness and headache.   Hematological: Negative for adenopathy.   Psychiatric/Behavioral: Negative for depressed mood. The patient is nervous/anxious.        Objective   /89 (BP Location: Left arm, Patient Position: Sitting, Cuff Size: Adult)   Pulse 99   Temp 97.8 °F (36.6 °C)   Wt 81.2 kg (179 lb)   SpO2 96%   BMI 28.04 kg/m²   Physical Exam   Constitutional: She is oriented to person, place, and time. She appears well-developed. No distress.   HENT:   Head: Normocephalic.   Eyes: Conjunctivae and lids are normal.   Neck: Trachea normal. No thyroid mass and no thyromegaly present.   Cardiovascular: Normal rate, regular rhythm and normal heart sounds.   Pulmonary/Chest: Effort normal and breath sounds normal.   Lymphadenopathy:     She has no cervical adenopathy.   Neurological: She is alert and oriented to person, place, and time.   Skin: Skin is warm and dry.   Psychiatric: She has a normal mood and affect. Her speech is normal and behavior is normal. She is attentive.       No visits with results within 1 Week(s) from this visit.   Latest known visit with results is:   Office Visit on 07/25/2019   Component Date Value Ref Range Status   • WBC 07/25/2019 8.43  3.40 - 10.80 10*3/mm3 Final   • RBC 07/25/2019 4.49  3.77 - 5.28 10*6/mm3 Final   • Hemoglobin 07/25/2019 12.8  12.0 - 15.9 g/dL Final   • Hematocrit 07/25/2019 38.8  34.0 - 46.6 % Final   • MCV 07/25/2019 86.4  79.0 - 97.0 fL Final   • MCH 07/25/2019 28.5  26.6 - 33.0 pg Final   • MCHC 07/25/2019 33.0  31.5 - 35.7 g/dL Final   • RDW 07/25/2019 13.8  12.3 - 15.4 % Final   • RDW-SD 07/25/2019 42.6  37.0 - 54.0 fl Final   • MPV 07/25/2019 9.6  6.0 - 12.0 fL Final   • Platelets 07/25/2019 308  973 - 997  10*3/mm3 Final   • Neutrophil % 07/25/2019 57.7  42.7 - 76.0 % Final   • Lymphocyte % 07/25/2019 31.1  19.6 - 45.3 % Final   • Monocyte % 07/25/2019 7.7  5.0 - 12.0 % Final   • Eosinophil % 07/25/2019 3.4  0.3 - 6.2 % Final   • Basophil % 07/25/2019 0.1  0.0 - 1.5 % Final   • Neutrophils, Absolute 07/25/2019 4.86  1.70 - 7.00 10*3/mm3 Final   • Lymphocytes, Absolute 07/25/2019 2.62  0.70 - 3.10 10*3/mm3 Final   • Monocytes, Absolute 07/25/2019 0.65  0.10 - 0.90 10*3/mm3 Final   • Eosinophils, Absolute 07/25/2019 0.29  0.00 - 0.40 10*3/mm3 Final   • Basophils, Absolute 07/25/2019 0.01  0.00 - 0.20 10*3/mm3 Final           Assessment/Plan   Problems Addressed this Visit        Cardiovascular and Mediastinum    Essential hypertension - Primary     Hypertension is newly identified.  Dietary sodium restriction.  Weight loss.  Regular aerobic exercise.  Medication changes per orders.  Blood pressure will be reassessed in 4 weeks.         Relevant Medications    lisinopril (PRINIVIL,ZESTRIL) 10 MG tablet       Other    Anxiety     Stable on current medicine.          Relevant Medications    ALPRAZolam (XANAX) 0.25 MG tablet

## 2020-06-04 ENCOUNTER — TELEPHONE (OUTPATIENT)
Dept: FAMILY MEDICINE CLINIC | Facility: CLINIC | Age: 70
End: 2020-06-04

## 2020-06-04 NOTE — TELEPHONE ENCOUNTER
Pt c/o her BP being low in the mornings. Today it is 88/58. She takes the med at suppertime and last night before she took it her BP was 121/78. I did advise her to take her BP before taking the med and if is it is normal range of 120/80 ge do not take the med and I would call her tomorrow.

## 2020-06-08 ENCOUNTER — TELEPHONE (OUTPATIENT)
Dept: FAMILY MEDICINE CLINIC | Facility: CLINIC | Age: 70
End: 2020-06-08

## 2020-06-08 RX ORDER — GABAPENTIN 300 MG/1
CAPSULE ORAL
Qty: 90 CAPSULE | Refills: 5 | Status: SHIPPED | OUTPATIENT
Start: 2020-06-08 | End: 2020-12-14

## 2020-06-08 NOTE — TELEPHONE ENCOUNTER
Pt called stating she is still concerned about her BP. It still been running low but one reading was 115/108. She she be concerned? She wants to know if the bottom number high should she take her med?

## 2020-06-11 ENCOUNTER — APPOINTMENT (OUTPATIENT)
Dept: BONE DENSITY | Facility: HOSPITAL | Age: 70
End: 2020-06-11

## 2020-06-19 ENCOUNTER — HOSPITAL ENCOUNTER (OUTPATIENT)
Dept: BONE DENSITY | Facility: HOSPITAL | Age: 70
Discharge: HOME | End: 2020-06-19
Admitting: INTERNAL MEDICINE

## 2020-06-19 DIAGNOSIS — Z78.0 OSTEOPENIA AFTER MENOPAUSE: ICD-10-CM

## 2020-06-19 DIAGNOSIS — M85.80 OSTEOPENIA AFTER MENOPAUSE: ICD-10-CM

## 2020-06-19 PROCEDURE — 77080 DXA BONE DENSITY AXIAL: CPT

## 2020-09-01 ENCOUNTER — OFFICE VISIT (OUTPATIENT)
Dept: FAMILY MEDICINE CLINIC | Facility: CLINIC | Age: 70
End: 2020-09-01

## 2020-09-01 ENCOUNTER — LAB (OUTPATIENT)
Dept: FAMILY MEDICINE CLINIC | Facility: CLINIC | Age: 70
End: 2020-09-01

## 2020-09-01 VITALS
HEART RATE: 84 BPM | WEIGHT: 174 LBS | HEIGHT: 65 IN | BODY MASS INDEX: 28.99 KG/M2 | DIASTOLIC BLOOD PRESSURE: 81 MMHG | TEMPERATURE: 96.8 F | OXYGEN SATURATION: 95 % | SYSTOLIC BLOOD PRESSURE: 126 MMHG

## 2020-09-01 DIAGNOSIS — Z83.3 FAMILY HISTORY OF DIABETES MELLITUS: ICD-10-CM

## 2020-09-01 DIAGNOSIS — Z00.00 MEDICARE ANNUAL WELLNESS VISIT, SUBSEQUENT: Primary | ICD-10-CM

## 2020-09-01 DIAGNOSIS — Z23 NEED FOR VACCINATION: ICD-10-CM

## 2020-09-01 DIAGNOSIS — R73.01 FASTING HYPERGLYCEMIA: ICD-10-CM

## 2020-09-01 DIAGNOSIS — I10 ESSENTIAL HYPERTENSION: ICD-10-CM

## 2020-09-01 LAB
ALBUMIN SERPL-MCNC: 4 G/DL (ref 3.5–5.2)
ALBUMIN/GLOB SERPL: 1.1 G/DL
ALP SERPL-CCNC: 56 U/L (ref 39–117)
ALT SERPL W P-5'-P-CCNC: 23 U/L (ref 1–33)
ANION GAP SERPL CALCULATED.3IONS-SCNC: 13.1 MMOL/L (ref 5–15)
AST SERPL-CCNC: 20 U/L (ref 1–32)
BILIRUB SERPL-MCNC: 0.3 MG/DL (ref 0–1.2)
BUN SERPL-MCNC: 17 MG/DL (ref 8–23)
BUN/CREAT SERPL: 20.5 (ref 7–25)
CALCIUM SPEC-SCNC: 9.9 MG/DL (ref 8.6–10.5)
CHLORIDE SERPL-SCNC: 103 MMOL/L (ref 98–107)
CHOLEST SERPL-MCNC: 148 MG/DL (ref 0–200)
CO2 SERPL-SCNC: 25.9 MMOL/L (ref 22–29)
CREAT SERPL-MCNC: 0.83 MG/DL (ref 0.57–1)
GFR SERPL CREATININE-BSD FRML MDRD: 68 ML/MIN/1.73
GLOBULIN UR ELPH-MCNC: 3.5 GM/DL
GLUCOSE SERPL-MCNC: 93 MG/DL (ref 65–99)
HBA1C MFR BLD: 6 % (ref 3.5–5.6)
HDLC SERPL-MCNC: 59 MG/DL (ref 40–60)
LDLC SERPL CALC-MCNC: 58 MG/DL (ref 0–100)
LDLC/HDLC SERPL: 0.98 {RATIO}
POTASSIUM SERPL-SCNC: 4.1 MMOL/L (ref 3.5–5.2)
PROT SERPL-MCNC: 7.5 G/DL (ref 6–8.5)
SODIUM SERPL-SCNC: 142 MMOL/L (ref 136–145)
TRIGL SERPL-MCNC: 155 MG/DL (ref 0–150)
VLDLC SERPL-MCNC: 31 MG/DL (ref 5–40)

## 2020-09-01 PROCEDURE — G0009 ADMIN PNEUMOCOCCAL VACCINE: HCPCS | Performed by: FAMILY MEDICINE

## 2020-09-01 PROCEDURE — G0439 PPPS, SUBSEQ VISIT: HCPCS | Performed by: FAMILY MEDICINE

## 2020-09-01 PROCEDURE — 80053 COMPREHEN METABOLIC PANEL: CPT | Performed by: FAMILY MEDICINE

## 2020-09-01 PROCEDURE — 80061 LIPID PANEL: CPT | Performed by: FAMILY MEDICINE

## 2020-09-01 PROCEDURE — 83036 HEMOGLOBIN GLYCOSYLATED A1C: CPT | Performed by: FAMILY MEDICINE

## 2020-09-01 PROCEDURE — 90732 PPSV23 VACC 2 YRS+ SUBQ/IM: CPT | Performed by: FAMILY MEDICINE

## 2020-09-01 PROCEDURE — 36415 COLL VENOUS BLD VENIPUNCTURE: CPT | Performed by: FAMILY MEDICINE

## 2020-09-01 NOTE — PROGRESS NOTES
Initial Medicare Wellness Visit   The ABC's of the Annual Wellness Visit    Chief Complaint   Patient presents with   • Medicare Wellness-subsequent       HPI:  dEie Byrnes, -1950, is a 69 y.o. female who presents for an Initial Medicare Wellness Visit.    Recent Hospitalizations:  No hospitalization(s) within the last year..    Current Medical Providers:  Patient Care Team:  Regina Gan MD as PCP - General  Regina Gan MD as PCP - Claims Attributed  Sohan Fernandez MD as Consulting Physician (Hematology and Oncology)    Health Habits and Functional and Cognitive Screening and Depression Screening:  Functional & Cognitive Status 2020   Do you have difficulty preparing food and eating? No   Do you have difficulty bathing yourself, getting dressed or grooming yourself? No   Do you have difficulty using the toilet? No   Do you have difficulty moving around from place to place? No   Do you have trouble with steps or getting out of a bed or a chair? No   Current Diet Well Balanced Diet   Dental Exam Up to date   Eye Exam Up to date   Do you need help using the phone?  No   Are you deaf or do you have serious difficulty hearing?  No   Do you need help with transportation? No   Do you need help shopping? No   Do you need help preparing meals?  No   Do you need help with housework?  No   Do you need help with laundry? No   Do you need help taking your medications? No   Do you ever drive or ride in a car without wearing a seat belt? No   Have you felt unusual stress, anger or loneliness in the last month? No   Who do you live with? Alone   If you need help, do you have trouble finding someone available to you? No   Do you have difficulty concentrating, remembering or making decisions? No       Compared to one year ago, the patient feels her physical health is the same and her mental health is the same.    Depression Screen:  PHQ-2/PHQ-9 Depression Screening 2020   Little interest or  pleasure in doing things 0   Feeling down, depressed, or hopeless 0   Total Score 0         Past Medical/Family/Social History:  The following portions of the patient's history were reviewed and updated as appropriate: allergies, current medications, past family history, past medical history, past social history, past surgical history and problem list.    Allergies   Allergen Reactions   • Bactrim [Sulfamethoxazole-Trimethoprim] Rash   • Meperidine Nausea And Vomiting         Current Outpatient Medications:   •  ALPRAZolam (XANAX) 0.25 MG tablet, Take 1 tablet by mouth Daily As Needed for Anxiety., Disp: 30 tablet, Rfl: 0  •  Boswellia-Glucosamine-Vit D (GLUCOSAMINE COMPLEX) tablet, GLUCOSAMINE COMPLEX TABS, Disp: , Rfl:   •  Calcium Carb-Cholecalciferol (CALCIUM + D3) 600-200 MG-UNIT tablet, CALCIUM + D3 600-200 MG-UNIT TABS, Disp: , Rfl:   •  cyanocobalamin 100 MCG tablet, VITAMIN B12 TABS, Disp: , Rfl:   •  cyclobenzaprine (FLEXERIL) 10 MG tablet, Every 8 (Eight) Hours., Disp: , Rfl:   •  gabapentin (NEURONTIN) 300 MG capsule, TAKE 1 TABLET BY MOUTH 3 TIMES A DAY EVERY DAY, Disp: 90 capsule, Rfl: 5  •  Multiple Vitamins-Minerals (CENTRUM SILVER ULTRA WOMENS) tablet, CENTRUM SILVER ULTRA WOMENS TABS, Disp: , Rfl:   •  tamoxifen (NOLVADEX) 20 MG chemo tablet, TAKE 1 TABLET BY MOUTH EVERY DAY, Disp: 30 tablet, Rfl: 6  •  vitamin B-6 (PYRIDOXINE) 100 MG tablet, VITAMIN B-6 100 MG TABS, Disp: , Rfl:     Aspirin use counseling: Does not need ASA (and currently is not on it)    Current medication list contains no high risk medications.  No harmful drug interactions have been identified.     Family History   Problem Relation Age of Onset   • No Known Problems Other        Social History     Tobacco Use   • Smoking status: Never Smoker   • Smokeless tobacco: Never Used   Substance Use Topics   • Alcohol use: Yes     Comment: drinks alcohol occasionally; caffeine <1c qd       Past Surgical History:   Procedure Laterality  "Date   • BACK SURGERY  03/2013    low back surgery   • CERVICAL DISC ARTHROPLASTY  09/2010    repair a disc in her neck    • COLONOSCOPY  2012   • LAPAROSCOPIC CHOLECYSTECTOMY  07/2011   • MASTECTOMY Bilateral 08/2011       Patient Active Problem List   Diagnosis   • Malignant neoplasm of upper-outer quadrant of right breast in female, estrogen receptor positive (CMS/HCC)   • Osteopenia   • Chronic NSAID use   • Chronic back pain   • Cellulitis of right hand   • Essential hypertension   • Anxiety   • Medicare annual wellness visit, subsequent   • Family history of diabetes mellitus   • Fasting hyperglycemia       Review of Systems   Constitutional: Negative for chills and fever.   HENT: Negative for sinus pressure and sore throat.    Eyes: Negative for blurred vision.   Respiratory: Negative for cough and shortness of breath.    Cardiovascular: Negative for chest pain and palpitations.   Gastrointestinal: Negative for abdominal pain.   Endocrine: Negative for polyuria.   Skin: Negative for rash.   Neurological: Negative for dizziness and headache.   Hematological: Negative for adenopathy.   Psychiatric/Behavioral: Negative for depressed mood.       Objective     Vitals:    09/01/20 0900   BP: 126/81   BP Location: Left arm   Patient Position: Sitting   Cuff Size: Adult   Pulse: 84   Temp: 96.8 °F (36 °C)   SpO2: 95%   Weight: 78.9 kg (174 lb)   Height: 164.5 cm (64.75\")       Patient's Body mass index is 29.18 kg/m². BMI is above normal parameters. Recommendations include: exercise counseling.      No exam data present    The patient has no evidence of cognitve impairment.     Physical Exam   Constitutional: She is oriented to person, place, and time. She appears well-developed. No distress.   HENT:   Head: Normocephalic.   Eyes: Conjunctivae and lids are normal.   Neck: Trachea normal. No thyroid mass and no thyromegaly present.   Cardiovascular: Normal rate, regular rhythm and normal heart sounds. "   Pulmonary/Chest: Effort normal and breath sounds normal.   Lymphadenopathy:     She has no cervical adenopathy.   Neurological: She is alert and oriented to person, place, and time.   Skin: Skin is warm and dry.   Psychiatric: She has a normal mood and affect. Her speech is normal and behavior is normal. She is attentive.       Recent Lab Results:     Lab Results   Component Value Date    CHOL 147 05/31/2019    TRIG 169 (H) 05/31/2019    HDL 61 05/31/2019    LDLHDL 1.1 05/31/2019     No visits with results within 1 Week(s) from this visit.   Latest known visit with results is:   Office Visit on 07/25/2019   Component Date Value Ref Range Status   • WBC 07/25/2019 8.43  3.40 - 10.80 10*3/mm3 Final   • RBC 07/25/2019 4.49  3.77 - 5.28 10*6/mm3 Final   • Hemoglobin 07/25/2019 12.8  12.0 - 15.9 g/dL Final   • Hematocrit 07/25/2019 38.8  34.0 - 46.6 % Final   • MCV 07/25/2019 86.4  79.0 - 97.0 fL Final   • MCH 07/25/2019 28.5  26.6 - 33.0 pg Final   • MCHC 07/25/2019 33.0  31.5 - 35.7 g/dL Final   • RDW 07/25/2019 13.8  12.3 - 15.4 % Final   • RDW-SD 07/25/2019 42.6  37.0 - 54.0 fl Final   • MPV 07/25/2019 9.6  6.0 - 12.0 fL Final   • Platelets 07/25/2019 308  140 - 450 10*3/mm3 Final   • Neutrophil % 07/25/2019 57.7  42.7 - 76.0 % Final   • Lymphocyte % 07/25/2019 31.1  19.6 - 45.3 % Final   • Monocyte % 07/25/2019 7.7  5.0 - 12.0 % Final   • Eosinophil % 07/25/2019 3.4  0.3 - 6.2 % Final   • Basophil % 07/25/2019 0.1  0.0 - 1.5 % Final   • Neutrophils, Absolute 07/25/2019 4.86  1.70 - 7.00 10*3/mm3 Final   • Lymphocytes, Absolute 07/25/2019 2.62  0.70 - 3.10 10*3/mm3 Final   • Monocytes, Absolute 07/25/2019 0.65  0.10 - 0.90 10*3/mm3 Final   • Eosinophils, Absolute 07/25/2019 0.29  0.00 - 0.40 10*3/mm3 Final   • Basophils, Absolute 07/25/2019 0.01  0.00 - 0.20 10*3/mm3 Final         Assessment/Plan   Age-appropriate Screening Schedule:  Refer to the list below for future screening recommendations based on patient's  age, sex and/or medical conditions.      Health Maintenance   Topic Date Due   • TDAP/TD VACCINES (1 - Tdap) 10/29/1962   • ZOSTER VACCINE (1 of 2) 10/29/2001   • INFLUENZA VACCINE  08/01/2020   • COLONOSCOPY  01/02/2022   • DXA SCAN  06/19/2022   • MAMMOGRAM  Discontinued       Medicare Risks and Personalized Health Plan:  Advance Directive Discussion  Diabetic Lab Screening       CMS-Preventive Services Quick Reference  Medicare Preventive Services Addressed:  Annual Wellness Visit (AWV)  Bone Density Measurements    Advance Care Planning:  ACP discussion was held with the patient during this visit. Patient does not have an advance directive, information provided.    Diagnoses and all orders for this visit:    1. Medicare annual wellness visit, subsequent (Primary)  -     Hemoglobin A1c  -     Comprehensive Metabolic Panel  -     Lipid Panel    2. Essential hypertension  -     Hemoglobin A1c  -     Comprehensive Metabolic Panel  -     Lipid Panel    3. Family history of diabetes mellitus  -     Hemoglobin A1c    4. Fasting hyperglycemia  -     Hemoglobin A1c  -     Comprehensive Metabolic Panel        An After Visit Summary and PPPS with all of these plans were given to the patient.      Follow Up:  Return in about 1 year (around 9/1/2021) for Medicare Wellness.

## 2020-09-01 NOTE — PATIENT INSTRUCTIONS
Medicare Wellness  Personal Prevention Plan of Service     Date of Office Visit:  2020  Encounter Provider:  Regina Gan MD  Place of Service:  [facility]  Patient Name: Edie Byrnes  :  1950    As part of the Medicare Wellness portion of your visit today, we are providing you with this personalized preventive plan of services (PPPS). This plan is based upon recommendations of the [place] Preventive Services Task Force (USPSTF) and the Advisory Committee on Immunization Practices (ACIP).    This lists the preventive care services that should be considered, and provides dates of when you are due. Items listed as completed are up-to-date and do not require any further intervention.    Health Maintenance   Topic Date Due   • TDAP/TD VACCINES (1 - Tdap) 10/29/1962   • ZOSTER VACCINE (1 of 2) 10/29/2001   • Pneumococcal Vaccine Once at 65 Years Old  10/29/2016   • HEPATITIS C SCREENING  2018   • INFLUENZA VACCINE  2020   • MEDICARE ANNUAL WELLNESS  2021   • COLONOSCOPY  2022   • DXA SCAN  2022   • MAMMOGRAM  Discontinued       Orders Placed This Encounter   Procedures   • Hemoglobin A1c   • Comprehensive Metabolic Panel   • Lipid Panel       Return in about 1 year (around 2021) for Medicare Wellness.          Advance Directive    Advance directives are legal documents that let you make choices ahead of time about your health care and medical treatment in case you become unable to communicate for yourself. Advance directives are a way for you to communicate your wishes to family, friends, and health care providers. This can help convey your decisions about end-of-life care if you become unable to communicate.  Discussing and writing advance directives should happen over time rather than all at once. Advance directives can be changed depending on your situation and what you want, even after you have signed the advance  directives.  If you do not have an advance directive, some states assign family decision makers to act on your behalf based on how closely you are related to them. Each state has its own laws regarding advance directives. You may want to check with your health care provider, , or state representative about the laws in your state. There are different types of advance directives, such as:  · Medical power of .  · Living will.  · Do not resuscitate (DNR) or do not attempt resuscitation (DNAR) order.  Health care proxy and medical power of   A health care proxy, also called a health care agent, is a person who is appointed to make medical decisions for you in cases in which you are unable to make the decisions yourself. Generally, people choose someone they know well and trust to represent their preferences. Make sure to ask this person for an agreement to act as your proxy. A proxy may have to exercise judgment in the event of a medical decision for which your wishes are not known.  A medical power of  is a legal document that names your health care proxy. Depending on the laws in your state, after the document is written, it may also need to be:  · Signed.  · Notarized.  · Dated.  · Copied.  · Witnessed.  · Incorporated into your medical record.  You may also want to appoint someone to manage your financial affairs in a situation in which you are unable to do so. This is called a durable power of  for finances. It is a separate legal document from the durable power of  for health care. You may choose the same person or someone different from your health care proxy to act as your agent in financial matters.  If you do not appoint a proxy, or if there is a concern that the proxy is not acting in your best interests, a court-appointed guardian may be designated to act on your behalf.  Living will  A living will is a set of instructions documenting your wishes about medical  care when you cannot express them yourself. Health care providers should keep a copy of your living will in your medical record. You may want to give a copy to family members or friends. To alert caregivers in case of an emergency, you can place a card in your wallet to let them know that you have a living will and where they can find it. A living will is used if you become:  · Terminally ill.  · Incapacitated.  · Unable to communicate or make decisions.  Items to consider in your living will include:  · The use or non-use of life-sustaining equipment, such as dialysis machines and breathing machines (ventilators).  · A DNR or DNAR order, which is the instruction not to use cardiopulmonary resuscitation (CPR) if breathing or heartbeat stops.  · The use or non-use of tube feeding.  · Withholding of food and fluids.  · Comfort (palliative) care when the goal becomes comfort rather than a cure.  · Organ and tissue donation.  A living will does not give instructions for distributing your money and property if you should pass away. It is recommended that you seek the advice of a  when writing a will. Decisions about taxes, beneficiaries, and asset distribution will be legally binding. This process can relieve your family and friends of any concerns surrounding disputes or questions that may come up about the distribution of your assets.  DNR or DNAR  A DNR or DNAR order is a request not to have CPR in the event that your heart stops beating or you stop breathing. If a DNR or DNAR order has not been made and shared, a health care provider will try to help any patient whose heart has stopped or who has stopped breathing. If you plan to have surgery, talk with your health care provider about how your DNR or DNAR order will be followed if problems occur.  Summary  · Advance directives are the legal documents that allow you to make choices ahead of time about your health care and medical treatment in case you become  unable to communicate for yourself.  · The process of discussing and writing advance directives should happen over time. You can change the advance directives, even after you have signed them.  · Advance directives include DNR or DNAR orders, living rolon, and designating an agent as your medical power of .  This information is not intended to replace advice given to you by your health care provider. Make sure you discuss any questions you have with your health care provider.  Document Released: 03/26/2009 Document Revised: 01/22/2020 Document Reviewed: 11/06/2017  [employer] Patient Education © 2020 [employer]    Please check with your insurance and get Shingrix vaccine series at your pharmacy to help protect you from getting shingles.

## 2020-11-02 RX ORDER — TAMOXIFEN CITRATE 20 MG/1
TABLET ORAL
Qty: 90 TABLET | Refills: 2 | Status: SHIPPED | OUTPATIENT
Start: 2020-11-02 | End: 2021-07-19

## 2020-12-14 RX ORDER — GABAPENTIN 300 MG/1
CAPSULE ORAL
Qty: 90 CAPSULE | Refills: 5 | Status: SHIPPED | OUTPATIENT
Start: 2020-12-14 | End: 2021-06-07

## 2021-06-07 RX ORDER — GABAPENTIN 300 MG/1
CAPSULE ORAL
Qty: 90 CAPSULE | Refills: 5 | Status: SHIPPED | OUTPATIENT
Start: 2021-06-07 | End: 2021-10-12 | Stop reason: SDUPTHER

## 2021-07-19 RX ORDER — TAMOXIFEN CITRATE 20 MG/1
TABLET ORAL
Qty: 90 TABLET | Refills: 2 | Status: SHIPPED | OUTPATIENT
Start: 2021-07-19 | End: 2023-02-07

## 2021-08-31 ENCOUNTER — TELEPHONE (OUTPATIENT)
Dept: FAMILY MEDICINE CLINIC | Facility: CLINIC | Age: 71
End: 2021-08-31

## 2021-08-31 NOTE — TELEPHONE ENCOUNTER
She should test again 5 days after her last exposure to her grandson.  Does she want me to order the test at [facility] or is she going to go somewhere else to be tested?

## 2021-08-31 NOTE — TELEPHONE ENCOUNTER
PATIENT CALLED AND STATED SHE WAS EXPOSED TO SOMEONE AT WORK WITH COVID AND TESTED NEG FOR COVID ON Saturday. PATIENT HAS A GRANDSON THAT LIVES WITH HER AND HE WAS TESTED ON Sunday AND WAS POSITIVE. PATIENT IS CURRENTLY QUARANTINING, BUT WOULD LIKE TO KNOW HOW LONG SHE SHOULD WAIT TO BE RETEST. PATIENT STATES SHE IS NOT HAVING ANY SYMPTOMS, AND GRANDSON IS NOT STAYING WITH HER CURRENTLY       CALL BACK     [phone]

## 2021-09-02 ENCOUNTER — TELEPHONE (OUTPATIENT)
Dept: FAMILY MEDICINE CLINIC | Facility: CLINIC | Age: 71
End: 2021-09-02

## 2021-09-02 DIAGNOSIS — Z20.822 CLOSE EXPOSURE TO COVID-19 VIRUS: Primary | ICD-10-CM

## 2021-09-28 DIAGNOSIS — F41.9 ANXIETY: ICD-10-CM

## 2021-09-28 RX ORDER — ALPRAZOLAM 0.25 MG/1
0.25 TABLET ORAL DAILY PRN
Qty: 30 TABLET | Refills: 0 | Status: SHIPPED | OUTPATIENT
Start: 2021-09-28 | End: 2021-10-27

## 2021-09-28 NOTE — TELEPHONE ENCOUNTER
Caller: Edie Byrnes    Relationship: Self      Medication requested (name and dosage):   ALPRAZolam (XANAX) 0.25 MG tablet    Pharmacy where request should be sent:   [facility]/pharmacy #[id number] - [place], [place] - [address] [place] [address] - [phone]  - [phone]   [phone]    Additional details provided by patient: CURRENTLY OUT OF MEDICATION. SCHEDULED APPOINTMENT ON 11/22/21 BUT PATIENT WOULD LIKE A REFILL BEFORE APPOINTMENT. PATIENT WOULD ALSO LIKE A CALL BACK ABOUT THIS.     Best call back number: [phone]    Does the patient have less than a 3 day supply:  [x] Yes  [] No    Edgar Geronimo Rep   09/28/21 11:12 EDT

## 2021-10-12 NOTE — TELEPHONE ENCOUNTER
Caller: Edie Byrnes    Relationship: Self      Medication requested (name and dosage): gabapentin (NEURONTIN) 300 MG capsule    Pharmacy where request should be sent: [facility], [place] [address] - [phone]  - [phone]   [phone]    Additional details provided by patient: THE PATIENT IS OUT OF THE MEDICATION     Best call back number: [phone]    Does the patient have less than a 3 day supply:  [x] Yes  [] No    Edgar Dimas Rep   10/12/21 08:14 EDT

## 2021-10-13 RX ORDER — GABAPENTIN 300 MG/1
300 CAPSULE ORAL 3 TIMES DAILY
Qty: 90 CAPSULE | Refills: 5 | Status: SHIPPED | OUTPATIENT
Start: 2021-10-13 | End: 2021-12-06

## 2021-10-27 DIAGNOSIS — F41.9 ANXIETY: ICD-10-CM

## 2021-10-27 RX ORDER — ALPRAZOLAM 0.25 MG/1
TABLET ORAL
Qty: 30 TABLET | Refills: 0 | Status: SHIPPED | OUTPATIENT
Start: 2021-10-27

## 2021-11-22 ENCOUNTER — LAB (OUTPATIENT)
Dept: FAMILY MEDICINE CLINIC | Facility: CLINIC | Age: 71
End: 2021-11-22

## 2021-11-22 ENCOUNTER — OFFICE VISIT (OUTPATIENT)
Dept: FAMILY MEDICINE CLINIC | Facility: CLINIC | Age: 71
End: 2021-11-22

## 2021-11-22 VITALS
HEIGHT: 65 IN | WEIGHT: 181 LBS | DIASTOLIC BLOOD PRESSURE: 83 MMHG | BODY MASS INDEX: 30.16 KG/M2 | TEMPERATURE: 97.1 F | OXYGEN SATURATION: 96 % | HEART RATE: 87 BPM | SYSTOLIC BLOOD PRESSURE: 141 MMHG

## 2021-11-22 DIAGNOSIS — R73.01 FASTING HYPERGLYCEMIA: ICD-10-CM

## 2021-11-22 DIAGNOSIS — Z00.00 MEDICARE ANNUAL WELLNESS VISIT, SUBSEQUENT: Primary | ICD-10-CM

## 2021-11-22 DIAGNOSIS — Z23 NEED FOR VACCINATION: ICD-10-CM

## 2021-11-22 DIAGNOSIS — Z11.59 NEED FOR HEPATITIS C SCREENING TEST: ICD-10-CM

## 2021-11-22 DIAGNOSIS — I10 ESSENTIAL HYPERTENSION: ICD-10-CM

## 2021-11-22 LAB
ALBUMIN SERPL-MCNC: 4.4 G/DL (ref 3.5–5.2)
ALBUMIN/GLOB SERPL: 1.4 G/DL
ALP SERPL-CCNC: 73 U/L (ref 39–117)
ALT SERPL W P-5'-P-CCNC: 20 U/L (ref 1–33)
ANION GAP SERPL CALCULATED.3IONS-SCNC: 12.9 MMOL/L (ref 5–15)
AST SERPL-CCNC: 21 U/L (ref 1–32)
BILIRUB SERPL-MCNC: 0.2 MG/DL (ref 0–1.2)
BUN SERPL-MCNC: 16 MG/DL (ref 8–23)
BUN/CREAT SERPL: 22.5 (ref 7–25)
CALCIUM SPEC-SCNC: 9.4 MG/DL (ref 8.6–10.5)
CHLORIDE SERPL-SCNC: 103 MMOL/L (ref 98–107)
CHOLEST SERPL-MCNC: 127 MG/DL (ref 0–200)
CO2 SERPL-SCNC: 23.1 MMOL/L (ref 22–29)
CREAT SERPL-MCNC: 0.71 MG/DL (ref 0.57–1)
GFR SERPL CREATININE-BSD FRML MDRD: 81 ML/MIN/1.73
GLOBULIN UR ELPH-MCNC: 3.2 GM/DL
GLUCOSE SERPL-MCNC: 108 MG/DL (ref 65–99)
HBA1C MFR BLD: 5.9 % (ref 3.5–5.6)
HCV AB SER DONR QL: NORMAL
HDLC SERPL-MCNC: 51 MG/DL (ref 40–60)
LDLC SERPL CALC-MCNC: 48 MG/DL (ref 0–100)
LDLC/HDLC SERPL: 0.82 {RATIO}
POTASSIUM SERPL-SCNC: 4.3 MMOL/L (ref 3.5–5.2)
PROT SERPL-MCNC: 7.6 G/DL (ref 6–8.5)
SODIUM SERPL-SCNC: 139 MMOL/L (ref 136–145)
TRIGL SERPL-MCNC: 171 MG/DL (ref 0–150)
VLDLC SERPL-MCNC: 28 MG/DL (ref 5–40)

## 2021-11-22 PROCEDURE — 80061 LIPID PANEL: CPT | Performed by: FAMILY MEDICINE

## 2021-11-22 PROCEDURE — 90662 IIV NO PRSV INCREASED AG IM: CPT | Performed by: FAMILY MEDICINE

## 2021-11-22 PROCEDURE — G0008 ADMIN INFLUENZA VIRUS VAC: HCPCS | Performed by: FAMILY MEDICINE

## 2021-11-22 PROCEDURE — 86803 HEPATITIS C AB TEST: CPT | Performed by: FAMILY MEDICINE

## 2021-11-22 PROCEDURE — 36415 COLL VENOUS BLD VENIPUNCTURE: CPT | Performed by: FAMILY MEDICINE

## 2021-11-22 PROCEDURE — 80053 COMPREHEN METABOLIC PANEL: CPT | Performed by: FAMILY MEDICINE

## 2021-11-22 PROCEDURE — 1159F MED LIST DOCD IN RCRD: CPT | Performed by: FAMILY MEDICINE

## 2021-11-22 PROCEDURE — 1170F FXNL STATUS ASSESSED: CPT | Performed by: FAMILY MEDICINE

## 2021-11-22 PROCEDURE — 83036 HEMOGLOBIN GLYCOSYLATED A1C: CPT | Performed by: FAMILY MEDICINE

## 2021-11-22 PROCEDURE — G0439 PPPS, SUBSEQ VISIT: HCPCS | Performed by: FAMILY MEDICINE

## 2021-11-22 RX ORDER — ASCORBIC ACID 500 MG
500 TABLET ORAL DAILY
COMMUNITY

## 2021-11-22 NOTE — PATIENT INSTRUCTIONS
Medicare Wellness  Personal Prevention Plan of Service     Date of Office Visit:  2021  Encounter Provider:  Regina Gan MD  Place of Service:  [facility]  Patient Name: Edie Byrnes  :  1950    As part of the Medicare Wellness portion of your visit today, we are providing you with this personalized preventive plan of services (PPPS). This plan is based upon recommendations of the [place] Preventive Services Task Force (USPSTF) and the Advisory Committee on Immunization Practices (ACIP).    This lists the preventive care services that should be considered, and provides dates of when you are due. Items listed as completed are up-to-date and do not require any further intervention.    Health Maintenance   Topic Date Due   • TDAP/TD VACCINES (1 - Tdap) Never done   • ZOSTER VACCINE (1 of 2) Never done   • HEPATITIS C SCREENING  Never done   • COLORECTAL CANCER SCREENING  2022   • DXA SCAN  2022   • ANNUAL WELLNESS VISIT  2022   • COVID-19 Vaccine  Completed   • INFLUENZA VACCINE  Completed   • Pneumococcal Vaccine 65+  Completed   • MAMMOGRAM  Discontinued       Orders Placed This Encounter   Procedures   • Fluzone High-Dose 65+yrs ([id number])   • Comprehensive Metabolic Panel     Order Specific Question:   Release to patient     Answer:   Immediate   • Lipid Panel   • Hemoglobin A1c     Order Specific Question:   Release to patient     Answer:   Immediate   • Hepatitis C Antibody     Standing Status:   Future     Standing Expiration Date:   2022     Order Specific Question:   Release to patient     Answer:   Immediate       Return in about 1 year (around 2022) for Medicare Wellness.

## 2021-11-22 NOTE — PROGRESS NOTES
The ABCs of the Annual Wellness Visit  Subsequent Medicare Wellness Visit    Chief Complaint   Patient presents with   • Medicare Wellness-subsequent     fasting      Subjective    History of Present Illness:  Edie Byrnes is a 71 y.o. female who presents for a Subsequent Medicare Wellness Visit.    The following portions of the patient's history were reviewed and   updated as appropriate: allergies, current medications, past family history, past medical history, past social history, past surgical history and problem list.    Compared to one year ago, the patient feels her physical   health is the same.    Compared to one year ago, the patient feels her mental   health is the same.    Recent Hospitalizations:  She was not admitted to the hospital during the last year.       Current Medical Providers:  Patient Care Team:  Regina Gan MD as PCP - General  Sohan Fernandez MD as Consulting Physician (Hematology and Oncology)    Outpatient Medications Prior to Visit   Medication Sig Dispense Refill   • ascorbic acid (VITAMIN C) 500 MG tablet Take 500 mg by mouth Daily.     • Boswellia-Glucosamine-Vit D (OSTEO BI-FLEX ONE PER DAY PO) Take  by mouth.     • ALPRAZolam (XANAX) 0.25 MG tablet TAKE 1 TABLET BY MOUTH EVERY DAY AS NEEDED FOR ANXIETY 30 tablet 0   • Calcium Carb-Cholecalciferol (CALCIUM + D3) 600-200 MG-UNIT tablet CALCIUM + D3 600-200 MG-UNIT TABS     • cyanocobalamin 100 MCG tablet VITAMIN B12 TABS     • gabapentin (NEURONTIN) 300 MG capsule Take 1 capsule by mouth 3 (Three) Times a Day. 90 capsule 5   • Multiple Vitamins-Minerals (CENTRUM SILVER ULTRA WOMENS) tablet CENTRUM SILVER ULTRA WOMENS TABS     • tamoxifen (NOLVADEX) 20 MG chemo tablet TAKE 1 TABLET BY MOUTH EVERY DAY 90 tablet 2   • vitamin B-6 (PYRIDOXINE) 100 MG tablet VITAMIN B-6 100 MG TABS     • Boswellia-Glucosamine-Vit D (GLUCOSAMINE COMPLEX) tablet GLUCOSAMINE COMPLEX TABS     • cyclobenzaprine (FLEXERIL) 10 MG tablet Every 8  "(Eight) Hours.       No facility-administered medications prior to visit.       No opioid medication identified on active medication list. I have reviewed chart for other potential  high risk medication/s and harmful drug interactions in the elderly.          Aspirin is not on active medication list.  Aspirin use is not indicated based on review of current medical condition/s. Risk of harm outweighs potential benefits.  .    Patient Active Problem List   Diagnosis   • Malignant neoplasm of upper-outer quadrant of right breast in female, estrogen receptor positive (HCC)   • Osteopenia   • Chronic NSAID use   • Chronic back pain   • Cellulitis of right hand   • Essential hypertension   • Anxiety   • Medicare annual wellness visit, subsequent   • Family history of diabetes mellitus   • Fasting hyperglycemia   • Need for vaccination   • Need for hepatitis C screening test     Advance Care Planning  Advance Directive is not on file.  ACP discussion was held with the patient during this visit. Patient does not have an advance directive, information provided.          Objective    Vitals:    11/22/21 0918   BP: 141/83   BP Location: Left arm   Patient Position: Sitting   Cuff Size: Adult   Pulse: 87   Temp: 97.1 °F (36.2 °C)   TempSrc: Infrared   SpO2: 96%   Weight: 82.1 kg (181 lb)   Height: 163.8 cm (64.5\")     BMI Readings from Last 1 Encounters:   11/22/21 30.59 kg/m²   BMI is above normal parameters. Recommendations include: exercise counseling    Does the patient have evidence of cognitive impairment? No    Physical Exam  Constitutional:       General: She is not in acute distress.     Appearance: She is well-developed.   HENT:      Head: Normocephalic.   Eyes:      General: Lids are normal.      Conjunctiva/sclera: Conjunctivae normal.   Neck:      Thyroid: No thyroid mass or thyromegaly.      Trachea: Trachea normal.   Cardiovascular:      Rate and Rhythm: Normal rate and regular rhythm.      Heart sounds: Normal " heart sounds.   Pulmonary:      Effort: Pulmonary effort is normal.      Breath sounds: Normal breath sounds.   Abdominal:      Palpations: Abdomen is soft.   Musculoskeletal:      Cervical back: Normal range of motion.   Lymphadenopathy:      Cervical: No cervical adenopathy.   Skin:     General: Skin is warm and dry.   Neurological:      Mental Status: She is alert and oriented to person, place, and time.   Psychiatric:         Attention and Perception: She is attentive.         Mood and Affect: Mood normal.         Speech: Speech normal.         Behavior: Behavior normal.                 HEALTH RISK ASSESSMENT    Smoking Status:  Social History     Tobacco Use   Smoking Status Never Smoker   Smokeless Tobacco Never Used     Alcohol Consumption:  Social History     Substance and Sexual Activity   Alcohol Use Yes    Comment: drinks alcohol occasionally; caffeine <1c qd     Fall Risk Screen:    [facility] Fall Risk Assessment was completed, and patient is at LOW risk for falls.Assessment completed on:11/22/2021    Depression Screening:  PHQ-2/PHQ-9 Depression Screening 11/22/2021   Little interest or pleasure in doing things 0   Feeling down, depressed, or hopeless 0   Total Score 0       Health Habits and Functional and Cognitive Screening:  Functional & Cognitive Status 11/22/2021   Do you have difficulty preparing food and eating? No   Do you have difficulty bathing yourself, getting dressed or grooming yourself? No   Do you have difficulty using the toilet? No   Do you have difficulty moving around from place to place? No   Do you have trouble with steps or getting out of a bed or a chair? No   Current Diet Well Balanced Diet   Dental Exam Not up to date   Eye Exam Up to date   Exercise (times per week) 2 times per week   Current Exercises Include Walking   Do you need help using the phone?  No   Are you deaf or do you have serious difficulty hearing?  No   Do you need help with transportation? No   Do you need help  shopping? No   Do you need help preparing meals?  No   Do you need help with housework?  No   Do you need help with laundry? No   Do you need help taking your medications? No   Do you ever drive or ride in a car without wearing a seat belt? No   Have you felt unusual stress, anger or loneliness in the last month? No   Who do you live with? Alone   If you need help, do you have trouble finding someone available to you? No   Do you have difficulty concentrating, remembering or making decisions? No       Age-appropriate Screening Schedule:  Refer to the list below for future screening recommendations based on patient's age, sex and/or medical conditions. Orders for these recommended tests are listed in the plan section. The patient has been provided with a written plan.    Health Maintenance   Topic Date Due   • TDAP/TD VACCINES (1 - Tdap) Never done   • ZOSTER VACCINE (1 of 2) Never done   • DXA SCAN  06/19/2022   • INFLUENZA VACCINE  Completed   • MAMMOGRAM  Discontinued              Assessment/Plan   CMS Preventative Services Quick Reference  Risk Factors Identified During Encounter  Immunizations Discussed/Encouraged (specific Immunizations; Influenza  The above risks/problems have been discussed with the patient.  Follow up actions/plans if indicated are seen below in the Assessment/Plan Section.  Pertinent information has been shared with the patient in the After Visit Summary.    Diagnoses and all orders for this visit:    1. Medicare annual wellness visit, subsequent (Primary)  -     Comprehensive Metabolic Panel  -     Lipid Panel  -     Hemoglobin A1c    2. Fasting hyperglycemia  -     Comprehensive Metabolic Panel  -     Lipid Panel  -     Hemoglobin A1c    3. Need for vaccination  -     Fluzone High-Dose 65+yrs ([id number])    4. Essential hypertension  -     Comprehensive Metabolic Panel  -     Lipid Panel  -     Hemoglobin A1c    5. Need for hepatitis C screening test  -     Hepatitis C Antibody;  Future        Follow Up:   Return in about 1 year (around 11/22/2022) for Medicare Wellness.     An After Visit Summary and PPPS were made available to the patient.

## 2021-12-06 RX ORDER — GABAPENTIN 300 MG/1
CAPSULE ORAL
Qty: 90 CAPSULE | Refills: 5 | Status: SHIPPED | OUTPATIENT
Start: 2021-12-06 | End: 2022-06-28

## 2021-12-28 ENCOUNTER — TELEPHONE (OUTPATIENT)
Dept: FAMILY MEDICINE CLINIC | Facility: CLINIC | Age: 71
End: 2021-12-28

## 2021-12-28 NOTE — TELEPHONE ENCOUNTER
THE PATIENT WANTED TO LET STAFF KNOW THAT SHE WILL BE SWITCHING HER PHARMACY TO [facility] - [place], [place] - [address] - [phone]  - [phone] FX. THIS WILL BECOME EFFECTIVE IN January, 2022.

## 2022-03-30 ENCOUNTER — TRANSCRIBE ORDERS (OUTPATIENT)
Dept: NEUROLOGY | Facility: HOSPITAL | Age: 72
End: 2022-03-30

## 2022-03-30 DIAGNOSIS — Z78.0 OSTEOPENIA AFTER MENOPAUSE: Primary | ICD-10-CM

## 2022-03-30 DIAGNOSIS — M85.80 OSTEOPENIA AFTER MENOPAUSE: Primary | ICD-10-CM

## 2022-06-20 ENCOUNTER — APPOINTMENT (OUTPATIENT)
Dept: BONE DENSITY | Facility: HOSPITAL | Age: 72
End: 2022-06-20

## 2022-06-28 RX ORDER — GABAPENTIN 300 MG/1
CAPSULE ORAL
Qty: 90 CAPSULE | Refills: 5 | Status: SHIPPED | OUTPATIENT
Start: 2022-06-28 | End: 2022-12-27

## 2022-07-13 ENCOUNTER — HOSPITAL ENCOUNTER (OUTPATIENT)
Dept: BONE DENSITY | Facility: HOSPITAL | Age: 72
Discharge: HOME | End: 2022-07-13
Admitting: INTERNAL MEDICINE

## 2022-07-13 DIAGNOSIS — M85.80 OSTEOPENIA AFTER MENOPAUSE: ICD-10-CM

## 2022-07-13 DIAGNOSIS — Z78.0 OSTEOPENIA AFTER MENOPAUSE: ICD-10-CM

## 2022-07-13 PROCEDURE — 77080 DXA BONE DENSITY AXIAL: CPT

## 2022-07-19 ENCOUNTER — TELEPHONE (OUTPATIENT)
Dept: FAMILY MEDICINE CLINIC | Facility: CLINIC | Age: 72
End: 2022-07-19

## 2022-07-19 NOTE — TELEPHONE ENCOUNTER
I sent in Rx for Paxlovid.      Quarantine for a minimum of 5 days or until symptoms resolve - whichever is longer.  Wear an N95 mask when around others for 10 days. Take Tylenol over the counter as needed for pain or fever.  Take Mucinex over the counter as needed for cough. Rest.  Stay hydrated.  Go to the ER if symptoms worsen.

## 2022-07-19 NOTE — TELEPHONE ENCOUNTER
Caller: Edie Byrnes    Relationship: Self    Best call back number:[phone]    What medication are you requesting: PAXLOVID    What are your current symptoms: CONGESTION, FATIGUE, COUGH    How long have you been experiencing symptoms: 07/16/22    Have you had these symptoms before:    [] Yes  [x] No    Have you been treated for these symptoms before:   [] Yes  [x] No    If a prescription is needed, what is your preferred pharmacy and phone number: [facility] [address] - [phone]  - [phone] FX     Additional notes:  TESTED POSITIVE Saturday FOR COVID.

## 2022-07-20 NOTE — TELEPHONE ENCOUNTER
PATIENT CALLING TO FOLLOW UP ON THE MEDICATION REQUEST SHE WOULD LIKE FOR SOMEONE TO PLEASE GIVER HER CALL TO LET HER KNOW.

## 2022-07-20 NOTE — TELEPHONE ENCOUNTER
If she is feeling better she does not have to take the medicine.  It is helpful for more severe cases but if her case is mild to moderate and she is starting to improve she does not need to take the Paxlovid.

## 2022-07-20 NOTE — TELEPHONE ENCOUNTER
PATIENT STATES THAT [facility] DOES NOT HAVE THIS MEDICATION AND SHE IS ASKING TO HAVE IT RESENT TO [facility].    SHE IS ASKING IF IT IS NECESSARY TO EVEN TAKE IT AT THIS POINT. SHE IS FEELING SOMEWHAT BETTER JUST WASN'T SURE IF SHE SHOULD STILL EVEN TAKE IT.      CONFIRMED PHARMACY  [facility]/pharmacy #[id number] - [place], [place] - [address] [place] [address] [phone] [facility] [phone] FX

## 2022-12-27 RX ORDER — GABAPENTIN 300 MG/1
CAPSULE ORAL
Qty: 90 CAPSULE | Refills: 0 | Status: SHIPPED | OUTPATIENT
Start: 2022-12-27 | End: 2023-01-25

## 2023-01-25 RX ORDER — GABAPENTIN 300 MG/1
CAPSULE ORAL
Qty: 90 CAPSULE | Refills: 0 | Status: SHIPPED | OUTPATIENT
Start: 2023-01-25 | End: 2023-02-22

## 2023-02-07 ENCOUNTER — OFFICE VISIT (OUTPATIENT)
Dept: FAMILY MEDICINE CLINIC | Facility: CLINIC | Age: 73
End: 2023-02-07
Payer: MEDICARE

## 2023-02-07 ENCOUNTER — LAB (OUTPATIENT)
Dept: FAMILY MEDICINE CLINIC | Facility: CLINIC | Age: 73
End: 2023-02-07
Payer: MEDICARE

## 2023-02-07 VITALS
BODY MASS INDEX: 28.32 KG/M2 | OXYGEN SATURATION: 96 % | TEMPERATURE: 98.4 F | SYSTOLIC BLOOD PRESSURE: 131 MMHG | WEIGHT: 170 LBS | DIASTOLIC BLOOD PRESSURE: 84 MMHG | HEART RATE: 77 BPM | HEIGHT: 65 IN

## 2023-02-07 DIAGNOSIS — Z00.00 MEDICARE ANNUAL WELLNESS VISIT, SUBSEQUENT: Primary | ICD-10-CM

## 2023-02-07 DIAGNOSIS — R73.01 FASTING HYPERGLYCEMIA: ICD-10-CM

## 2023-02-07 DIAGNOSIS — Z12.11 SCREEN FOR COLON CANCER: ICD-10-CM

## 2023-02-07 DIAGNOSIS — I10 ESSENTIAL HYPERTENSION: ICD-10-CM

## 2023-02-07 LAB
ALBUMIN SERPL-MCNC: 4.5 G/DL (ref 3.5–5.2)
ALBUMIN/GLOB SERPL: 1.5 G/DL
ALP SERPL-CCNC: 80 U/L (ref 39–117)
ALT SERPL W P-5'-P-CCNC: 26 U/L (ref 1–33)
ANION GAP SERPL CALCULATED.3IONS-SCNC: 8.2 MMOL/L (ref 5–15)
AST SERPL-CCNC: 23 U/L (ref 1–32)
BILIRUB SERPL-MCNC: 0.5 MG/DL (ref 0–1.2)
BUN SERPL-MCNC: 17 MG/DL (ref 8–23)
BUN/CREAT SERPL: 22.4 (ref 7–25)
CALCIUM SPEC-SCNC: 9.8 MG/DL (ref 8.6–10.5)
CHLORIDE SERPL-SCNC: 101 MMOL/L (ref 98–107)
CHOLEST SERPL-MCNC: 162 MG/DL (ref 0–200)
CO2 SERPL-SCNC: 28.8 MMOL/L (ref 22–29)
CREAT SERPL-MCNC: 0.76 MG/DL (ref 0.57–1)
EGFRCR SERPLBLD CKD-EPI 2021: 83.9 ML/MIN/1.73
GLOBULIN UR ELPH-MCNC: 3.1 GM/DL
GLUCOSE SERPL-MCNC: 104 MG/DL (ref 65–99)
HBA1C MFR BLD: 5.8 % (ref 3.5–5.6)
HDLC SERPL-MCNC: 64 MG/DL (ref 40–60)
LDLC SERPL CALC-MCNC: 85 MG/DL (ref 0–100)
LDLC/HDLC SERPL: 1.32 {RATIO}
POTASSIUM SERPL-SCNC: 4.4 MMOL/L (ref 3.5–5.2)
PROT SERPL-MCNC: 7.6 G/DL (ref 6–8.5)
SODIUM SERPL-SCNC: 138 MMOL/L (ref 136–145)
TRIGL SERPL-MCNC: 69 MG/DL (ref 0–150)
VLDLC SERPL-MCNC: 13 MG/DL (ref 5–40)

## 2023-02-07 PROCEDURE — 1159F MED LIST DOCD IN RCRD: CPT | Performed by: FAMILY MEDICINE

## 2023-02-07 PROCEDURE — 1170F FXNL STATUS ASSESSED: CPT | Performed by: FAMILY MEDICINE

## 2023-02-07 PROCEDURE — 1125F AMNT PAIN NOTED PAIN PRSNT: CPT | Performed by: FAMILY MEDICINE

## 2023-02-07 PROCEDURE — 83036 HEMOGLOBIN GLYCOSYLATED A1C: CPT | Performed by: FAMILY MEDICINE

## 2023-02-07 PROCEDURE — 36415 COLL VENOUS BLD VENIPUNCTURE: CPT | Performed by: FAMILY MEDICINE

## 2023-02-07 PROCEDURE — G0439 PPPS, SUBSEQ VISIT: HCPCS | Performed by: FAMILY MEDICINE

## 2023-02-07 PROCEDURE — 80061 LIPID PANEL: CPT | Performed by: FAMILY MEDICINE

## 2023-02-07 PROCEDURE — 1160F RVW MEDS BY RX/DR IN RCRD: CPT | Performed by: FAMILY MEDICINE

## 2023-02-07 PROCEDURE — 1126F AMNT PAIN NOTED NONE PRSNT: CPT | Performed by: FAMILY MEDICINE

## 2023-02-07 PROCEDURE — 80053 COMPREHEN METABOLIC PANEL: CPT | Performed by: FAMILY MEDICINE

## 2023-02-07 RX ORDER — DENOSUMAB 60 MG/ML
INJECTION SUBCUTANEOUS ONCE
COMMUNITY

## 2023-02-07 RX ORDER — ALENDRONATE SODIUM 70 MG/1
TABLET ORAL WEEKLY
COMMUNITY
Start: 2023-01-31 | End: 2023-02-07

## 2023-02-07 NOTE — PROGRESS NOTES
The ABCs of the Annual Wellness Visit  Subsequent Medicare Wellness Visit    Subjective      Edie Byrnes is a 72 y.o. female who presents for a Subsequent Medicare Wellness Visit.    The following portions of the patient's history were reviewed and   updated as appropriate: allergies, current medications, past family history, past medical history, past social history, past surgical history and problem list.    Compared to one year ago, the patient feels her physical   health is the same.    Compared to one year ago, the patient feels her mental   health is the same.    Recent Hospitalizations:  She was not admitted to the hospital during the last year.       Current Medical Providers:  Patient Care Team:  Regina Gan MD as PCP - General  Sohan Fernandez MD as Consulting Physician (Hematology and Oncology)    Outpatient Medications Prior to Visit   Medication Sig Dispense Refill   • ALPRAZolam (XANAX) 0.25 MG tablet TAKE 1 TABLET BY MOUTH EVERY DAY AS NEEDED FOR ANXIETY 30 tablet 0   • ascorbic acid (VITAMIN C) 500 MG tablet Take 500 mg by mouth Daily.     • [place]well[place]-Glucosamine-Vit D (OSTEO BI-FLEX ONE PER DAY PO) Take  by mouth.     • Calcium Carb-Cholecalciferol (CALCIUM + D3) 600-200 MG-UNIT tablet CALCIUM + D3 600-200 MG-UNIT TABS     • cyanocobalamin 100 MCG tablet VITAMIN B12 TABS     • gabapentin (NEURONTIN) 300 MG capsule TAKE ONE CAPSULE BY MOUTH THREE TIMES DAILY 90 capsule 0   • Multiple Vitamins-Minerals (CENTRUM SILVER ULTRA WOMENS) tablet CENTRUM SILVER ULTRA WOMENS TABS     • vitamin B-6 (PYRIDOXINE) 100 MG tablet VITAMIN B-6 100 MG TABS     • alendronate (FOSAMAX) 70 MG tablet Take  by mouth 1 (One) Time Per Week.     • denosumab (Prolia) 60 MG/ML solution prefilled syringe syringe Inject  under the skin into the appropriate area as directed 1 (One) Time.     • tamoxifen (NOLVADEX) 20 MG chemo tablet TAKE 1 TABLET BY MOUTH EVERY DAY 90 tablet 2     No facility-administered  "medications prior to visit.       No opioid medication identified on active medication list. I have reviewed chart for other potential  high risk medication/s and harmful drug interactions in the elderly.          Aspirin is not on active medication list.  Aspirin use is not indicated based on review of current medical condition/s. Risk of harm outweighs potential benefits.  .    Patient Active Problem List   Diagnosis   • Malignant neoplasm of upper-outer quadrant of right breast in female, estrogen receptor positive (HCC)   • Osteopenia   • Chronic NSAID use   • Chronic back pain   • Cellulitis of right hand   • Essential hypertension   • Anxiety   • Medicare annual wellness visit, subsequent   • Family history of diabetes mellitus   • Fasting hyperglycemia   • Need for vaccination   • Need for hepatitis C screening test   • Screen for colon cancer     Advance Care Planning  Advance Directive is not on file.  ACP discussion was held with the patient during this visit. Patient does not have an advance directive, information provided.     Objective    Vitals:    02/07/23 0830   BP: 131/84   BP Location: Left arm   Patient Position: Sitting   Cuff Size: Adult   Pulse: 77   Temp: 98.4 °F (36.9 °C)   TempSrc: Infrared   SpO2: 96%   Weight: 77.1 kg (170 lb)   Height: 163.8 cm (64.5\")     Estimated body mass index is 28.73 kg/m² as calculated from the following:    Height as of this encounter: 163.8 cm (64.5\").    Weight as of this encounter: 77.1 kg (170 lb).    BMI is >= 25 and <30. (Overweight) The following options were offered after discussion;: exercise counseling/recommendations    Physical Exam  Vitals and nursing note reviewed.   Constitutional:       General: She is not in acute distress.     Appearance: She is well-developed.   HENT:      Head: Normocephalic.      Right Ear: Tympanic membrane normal.      Left Ear: Tympanic membrane normal.      Mouth/Throat:      Mouth: Mucous membranes are moist.   Eyes:     "  General: Lids are normal.      Conjunctiva/sclera: Conjunctivae normal.   Neck:      Thyroid: No thyroid mass or thyromegaly.      Trachea: Trachea normal.   Cardiovascular:      Rate and Rhythm: Normal rate and regular rhythm.      Heart sounds: Normal heart sounds.   Pulmonary:      Effort: Pulmonary effort is normal.      Breath sounds: Normal breath sounds.   Musculoskeletal:      Cervical back: Normal range of motion.      Right lower leg: No edema.      Left lower leg: No edema.   Lymphadenopathy:      Cervical: No cervical adenopathy.   Skin:     General: Skin is warm and dry.   Neurological:      Mental Status: She is alert and oriented to person, place, and time.   Psychiatric:         Attention and Perception: She is attentive.         Mood and Affect: Mood normal.         Speech: Speech normal.         Behavior: Behavior normal.         Does the patient have evidence of cognitive impairment?   No            HEALTH RISK ASSESSMENT    Smoking Status:  Social History     Tobacco Use   Smoking Status Never   Smokeless Tobacco Never     Alcohol Consumption:  Social History     Substance and Sexual Activity   Alcohol Use Yes    Comment: drinks alcohol occasionally; caffeine <1c qd     Fall Risk Screen:    [facility] Fall Risk Assessment was completed, and patient is at LOW risk for falls.Assessment completed on:2/7/2023    Depression Screening:  PHQ-2/PHQ-9 Depression Screening 2/7/2023   Little Interest or Pleasure in Doing Things 0-->not at all   Feeling Down, Depressed or Hopeless 0-->not at all   PHQ-9: Brief Depression Severity Measure Score 0       Health Habits and Functional and Cognitive Screening:  Functional & Cognitive Status 2/7/2023   Do you have difficulty preparing food and eating? No   Do you have difficulty bathing yourself, getting dressed or grooming yourself? No   Do you have difficulty using the toilet? No   Do you have difficulty moving around from place to place? No   Do you have trouble  with steps or getting out of a bed or a chair? No   Current Diet Well Balanced Diet   Dental Exam Up to date   Eye Exam Up to date   Exercise (times per week) 4 times per week   Current Exercises Include Walking   Do you need help using the phone?  No   Are you deaf or do you have serious difficulty hearing?  No   Do you need help with transportation? No   Do you need help shopping? No   Do you need help preparing meals?  No   Do you need help with housework?  No   Do you need help with laundry? No   Do you need help taking your medications? No   Do you ever drive or ride in a car without wearing a seat belt? No   Have you felt unusual stress, anger or loneliness in the last month? No   Who do you live with? Alone   If you need help, do you have trouble finding someone available to you? No   Do you have difficulty concentrating, remembering or making decisions? No       Age-appropriate Screening Schedule:  Refer to the list below for future screening recommendations based on patient's age, sex and/or medical conditions. Orders for these recommended tests are listed in the plan section. The patient has been provided with a written plan.    Health Maintenance   Topic Date Due   • TDAP/TD VACCINES (1 - Tdap) Never done   • ZOSTER VACCINE (1 of 2) Never done   • DXA SCAN  07/13/2024   • INFLUENZA VACCINE  Completed   • MAMMOGRAM  Discontinued                CMS Preventative Services Quick Reference  Risk Factors Identified During Encounter:    Dental Screening Recommended  Vision Screening Recommended    The above risks/problems have been discussed with the patient.  Pertinent information has been shared with the patient in the After Visit Summary.    Diagnoses and all orders for this visit:    1. Medicare annual wellness visit, subsequent (Primary)  -     Comprehensive Metabolic Panel  -     Lipid Panel  -     Hemoglobin A1c    2. Screen for colon cancer  -     Cologuard - Stool, Per Rectum; Future    3. Fasting  hyperglycemia  -     Comprehensive Metabolic Panel  -     Lipid Panel  -     Hemoglobin A1c    4. Essential hypertension  -     Comprehensive Metabolic Panel  -     Lipid Panel  -     Hemoglobin A1c        Follow Up:   Next Medicare Wellness visit to be scheduled in 1 year.      An After Visit Summary and PPPS were made available to the patient.

## 2023-02-07 NOTE — PATIENT INSTRUCTIONS
Medicare Wellness  Personal Prevention Plan of Service     Date of Office Visit:    Encounter Provider:  Regina Gan MD  Place of Service:  [facility]  Patient Name: Edie Byrnes  :  1950    As part of the Medicare Wellness portion of your visit today, we are providing you with this personalized preventive plan of services (PPPS). This plan is based upon recommendations of the [place] Preventive Services Task Force (USPSTF) and the Advisory Committee on Immunization Practices (ACIP).    This lists the preventive care services that should be considered, and provides dates of when you are due. Items listed as completed are up-to-date and do not require any further intervention.    Health Maintenance   Topic Date Due    TDAP/TD VACCINES (1 - Tdap) Never done    ZOSTER VACCINE (1 of 2) Never done    COLORECTAL CANCER SCREENING  2022    ANNUAL WELLNESS VISIT  2024    DXA SCAN  2024    HEPATITIS C SCREENING  Completed    COVID-19 Vaccine  Completed    INFLUENZA VACCINE  Completed    Pneumococcal Vaccine 65+  Completed    MAMMOGRAM  Discontinued       Orders Placed This Encounter   Procedures    Cologuard - Stool, Per Rectum     Standing Status:   Future     Standing Expiration Date:   2024    Comprehensive Metabolic Panel     Order Specific Question:   Release to patient     Answer:   Routine Release    Lipid Panel    Hemoglobin A1c     Order Specific Question:   Release to patient     Answer:   Routine Release       Return in about 1 year (around 2024) for Medicare Wellness.

## 2023-02-22 RX ORDER — GABAPENTIN 300 MG/1
CAPSULE ORAL
Qty: 90 CAPSULE | Refills: 0 | Status: SHIPPED | OUTPATIENT
Start: 2023-02-22 | End: 2023-03-24

## 2023-03-24 RX ORDER — GABAPENTIN 300 MG/1
CAPSULE ORAL
Qty: 90 CAPSULE | Refills: 0 | Status: SHIPPED | OUTPATIENT
Start: 2023-03-24

## 2023-04-21 RX ORDER — GABAPENTIN 300 MG/1
CAPSULE ORAL
Qty: 90 CAPSULE | Refills: 1 | Status: SHIPPED | OUTPATIENT
Start: 2023-04-21

## 2023-05-31 DIAGNOSIS — F41.9 ANXIETY: ICD-10-CM

## 2023-05-31 NOTE — TELEPHONE ENCOUNTER
Caller: Edie Byrnes ILIA    Relationship: Self    Best call back number: [phone]    Requested Prescriptions:   Requested Prescriptions     Pending Prescriptions Disp Refills   • ALPRAZolam (XANAX) 0.25 MG tablet 30 tablet 0     Sig: Take 1 tablet by mouth Daily As Needed for Anxiety.        Pharmacy where request should be sent: [address] - [phone] [facility] [phone] [facility]     Last office visit with prescribing clinician: 2/7/2023   Last telemedicine visit with prescribing clinician: Visit date not found   Next office visit with prescribing clinician: 2/13/2024     Additional details provided by patient: HAD TO TAKE THEM MORE FREQUENTLY DUE TO SOME STRESSFUL THINGS THAT SHE HAS HAD TO DEAL WITH AT HOME, SHE IS DOWN TO ONE PILL REMAINING.     Does the patient have less than a 3 day supply:  [] Yes  [x] No    Would you like a call back once the refill request has been completed: [x] Yes [] No    If the office needs to give you a call back, can they leave a voicemail: [] Yes [] No    Edgar Hu Rep   05/31/23 15:56 EDT

## 2023-06-02 RX ORDER — ALPRAZOLAM 0.25 MG/1
0.25 TABLET ORAL DAILY PRN
Qty: 30 TABLET | Refills: 0 | Status: SHIPPED | OUTPATIENT
Start: 2023-06-02

## 2023-06-14 RX ORDER — GABAPENTIN 300 MG/1
CAPSULE ORAL
Qty: 90 CAPSULE | Refills: 1 | Status: SHIPPED | OUTPATIENT
Start: 2023-06-14

## 2023-08-08 DIAGNOSIS — F41.9 ANXIETY: ICD-10-CM

## 2023-08-08 RX ORDER — ALPRAZOLAM 0.25 MG/1
0.25 TABLET ORAL DAILY PRN
Qty: 30 TABLET | Refills: 0 | Status: SHIPPED | OUTPATIENT
Start: 2023-08-08

## 2023-08-08 NOTE — TELEPHONE ENCOUNTER
Caller: Soniya Edie ILIA    Relationship: Self    Best call back number: [phone]     Requested Prescriptions:   Requested Prescriptions     Pending Prescriptions Disp Refills    ALPRAZolam (XANAX) 0.25 MG tablet 30 tablet 0     Sig: Take 1 tablet by mouth Daily As Needed for Anxiety.        Pharmacy where request should be sent: [address] - [phone]  - [phone]     Last office visit with prescribing clinician: 2/7/2023   Last telemedicine visit with prescribing clinician: Visit date not found   Next office visit with prescribing clinician: 2/13/2024     Additional details provided by patient: PATIENT IS OUT OF MEDICATION    Does the patient have less than a 3 day supply:  [x] Yes  [] No    Would you like a call back once the refill request has been completed: [] Yes [x] No    If the office needs to give you a call back, can they leave a voicemail: [] Yes [x] No    Edgar Jones Rep   08/08/23 09:37 EDT

## 2023-08-11 RX ORDER — GABAPENTIN 300 MG/1
CAPSULE ORAL
Qty: 90 CAPSULE | Refills: 1 | Status: SHIPPED | OUTPATIENT
Start: 2023-08-11

## 2023-10-09 RX ORDER — GABAPENTIN 300 MG/1
CAPSULE ORAL
Qty: 90 CAPSULE | Refills: 1 | Status: SHIPPED | OUTPATIENT
Start: 2023-10-09

## 2023-12-11 RX ORDER — GABAPENTIN 300 MG/1
CAPSULE ORAL
Qty: 90 CAPSULE | Refills: 1 | Status: SHIPPED | OUTPATIENT
Start: 2023-12-11

## 2024-02-02 RX ORDER — GABAPENTIN 300 MG/1
CAPSULE ORAL
Qty: 90 CAPSULE | Refills: 1 | Status: SHIPPED | OUTPATIENT
Start: 2024-02-02

## 2024-02-13 ENCOUNTER — OFFICE VISIT (OUTPATIENT)
Dept: FAMILY MEDICINE CLINIC | Facility: CLINIC | Age: 74
End: 2024-02-13
Payer: MEDICARE

## 2024-02-13 VITALS
BODY MASS INDEX: 28.28 KG/M2 | WEIGHT: 176 LBS | OXYGEN SATURATION: 96 % | TEMPERATURE: 98 F | HEIGHT: 66 IN | HEART RATE: 82 BPM | SYSTOLIC BLOOD PRESSURE: 136 MMHG | DIASTOLIC BLOOD PRESSURE: 85 MMHG

## 2024-02-13 DIAGNOSIS — I10 ESSENTIAL HYPERTENSION: ICD-10-CM

## 2024-02-13 DIAGNOSIS — Z12.11 SCREEN FOR COLON CANCER: ICD-10-CM

## 2024-02-13 DIAGNOSIS — Z00.00 MEDICARE ANNUAL WELLNESS VISIT, SUBSEQUENT: Primary | ICD-10-CM

## 2024-02-13 DIAGNOSIS — R73.01 FASTING HYPERGLYCEMIA: ICD-10-CM

## 2024-02-13 PROCEDURE — 3075F SYST BP GE 130 - 139MM HG: CPT | Performed by: FAMILY MEDICINE

## 2024-02-13 PROCEDURE — 1160F RVW MEDS BY RX/DR IN RCRD: CPT | Performed by: FAMILY MEDICINE

## 2024-02-13 PROCEDURE — 1170F FXNL STATUS ASSESSED: CPT | Performed by: FAMILY MEDICINE

## 2024-02-13 PROCEDURE — 3079F DIAST BP 80-89 MM HG: CPT | Performed by: FAMILY MEDICINE

## 2024-02-13 PROCEDURE — G0439 PPPS, SUBSEQ VISIT: HCPCS | Performed by: FAMILY MEDICINE

## 2024-02-13 PROCEDURE — 1159F MED LIST DOCD IN RCRD: CPT | Performed by: FAMILY MEDICINE

## 2024-02-22 NOTE — PROGRESS NOTES
Hematology/Oncology Outpatient Consultation    Patient name: Edie Byrnes  : 1950  MRN: [id number]  Primary Care Physician: Regina Gan MD  Referring Physician: Sohan Fernandez MD  Reason For Consult:     Chief Complaint   Patient presents with    Appointment     Malignant neoplasm of upper-outer quadrant of right breast in female, estrogen receptor positive       History of Present Illness:    This 73-year-old female has a history of breast cancer.      In the summer of  was experiencing abdominal pain and was diagnosed with gallbladder disease.  A CT scan was done on 2011 showing a right breast mass measuring 3 cm in size, which was confirmed on the mammogram and ultrasound.  On 11 she underwent laparoscopic cholecystectomy and right breast biopsy pathology revealed poorly differentiated invasive ductal carcinoma that was estrogen and progesterone receptor positive and HER-2/jovita negative.  ER was greater than 90% and OK was 90%.  HER-2/jovita was 1+ on immunohistochemistry.  On 11 she underwent right breast mastectomy, prophylactic left breast mastectomy and right axillary sentinel lymph node biopsy and axillary lymph node dissection.  Pathology examination revealed invasive ductal carcinoma with tumor measuring 3.2 cm.  It was poorly differentiated surgical margin  negative, one sentinel lymph node was positive with micrometastasis, a second sentinel and 16 additional lymph nodes were negative.  There was no lymphovascular invasion and she was consider to have T2N1A (stage IIB disease).  She was seen in consultation by Dr. MYAH Sawyer MD at the [facility] in [place] on 11 using adjuvant online risk of recurrence over 10 years was estimated 66% and was thought that hormonal therapy would decrease her risk down to 39% and using adjuvant chemotherapy followed by adjuvant hormonal therapy would decrease the risk down to 27%.  Adjuvant chemotherapy  with Adriamycin and Cytoxan was given in a dose dense fraction every two weeks for four cycles followed by Taxotere given every three weeks for four treatments.  She completed four cycles of AC on 10/27/11 and received the first cycle of Taxotere on 11/17/11. She developed severe mucositis and worsening neuropathy after the first cycle of Taxotere and the dose was reduced by 10%.  Significant hand foot syndrome and mucositis developed after dose #2 and hence that she was changed to Taxol treatments every three weeks for the last two doses.  The first of two doses was given on 12/29/11 and the q. three week doses were chosen so that the patient has less neuropathy compared to the weekly doses.  Her ANC had dropped to 250 after the second Taxotere dose and hence she received Neulasta after 3 and 4 of Taxol.  Last dose of Taxol was given on 01/19/12 and the patient was started on Arimidex 1 mg p.o. daily on 02/08/12.  She had already been on calcium carbonate and vitamin D supplementation and DEXA scan on 02/02/12 had T-score of -1.2.  Infusaport was removed on 02/28/12.  She was having worsening low back pain and sciatica and was thought that Arimidex was possibly causing this and she was changed to Aromasin 25 mg daily on 05/07/12.  MRI of the lumbar spine was done on 05/15/12 revealing no evidence of metastatic disease.  There was probably ganglion cyst around the right acetabulum with benign appearance.  CT of the abdomen and pelvis on 10/10/12 revealed a simple renal cyst without evidence of metastatic disease due to worsening of DEXA scan.  On 02/04/14 with the T-score of -1.1.  A decision was made to change Aromasin to Tamoxifen.  A total of 10 years of hormonal therapy was planned to complete in February 20, 2022.  The patient apparently had borderline hypercalcemia with normal parathyroid hormone in April 2012, which subsequently resolved and has had epidural injections to the spine with limited success.  Her  insurance has changed and Dr. Kent was not having insurance plans and required her to change oncologist and hence she is seeing me today.   She has completed Tamoxifen 10 years duration of treatment in February 2022.  She has no evidence of disease  She is status post bilateral mastectomies no reconstruction          She subsequently developed tongue cancer in May 2023.  Patient had had a painful spot on the left portion of her tongue for 1 year.  She had a biopsy of the area which showed severe dysplasia with small focus of minimally invasive squamous cell carcinoma.  Patient has no history of smoking or drinking alcohol  She underwent left partial glossectomy in June 2023 the pathology was consistent with site of prior biopsy no evidence of malignancy    She is established with Dr. Ulloa with [facility] and is followed in the [facility] multidisciplinary clinic every 6 months which will be due in April 2024      She has neuropathy from Taxotere chemotherapy therapy.  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 Ulloa       Past Surgical History:   Procedure Laterality Date    BACK SURGERY  03/2013    low back surgery    CERVICAL DISC ARTHROPLASTY  09/2010    repair a disc in her neck     COLONOSCOPY  2012    LAPAROSCOPIC CHOLECYSTECTOMY  07/2011    MASTECTOMY Bilateral 08/2011         Current Outpatient Medications:     ALPRAZolam (XANAX) 0.25 MG tablet, Take 1  tablet by mouth Daily As Needed for Anxiety., Disp: 30 tablet, Rfl: 0    ascorbic acid (VITAMIN C) 500 MG tablet, Take 1 tablet by mouth Daily., Disp: , Rfl:     Calcium Carb-Cholecalciferol (CALCIUM + D3) 600-200 MG-UNIT tablet, CALCIUM + D3 600-200 MG-UNIT TABS, Disp: , Rfl:     cyanocobalamin 100 MCG tablet, VITAMIN B12 TABS, Disp: , Rfl:     denosumab (Prolia) 60 MG/ML solution prefilled syringe syringe, Inject  under the skin into the appropriate area as directed 1 (One) Time., Disp: , Rfl:     gabapentin (NEURONTIN) 300 MG capsule, TAKE ONE CAPSULE BY MOUTH THREE TIMES DAILY, Disp: 90 capsule, Rfl: 1    Multiple Vitamins-Minerals (CENTRUM SILVER ULTRA WOMENS) tablet, CENTRUM SILVER ULTRA WOMENS TABS, Disp: , Rfl:     vitamin B-6 (PYRIDOXINE) 100 MG tablet, VITAMIN B-6 100 MG TABS, Disp: , Rfl:     Allergies   Allergen Reactions    Bactrim [Sulfamethoxazole-Trimethoprim] Rash    Meperidine Nausea And Vomiting       Immunization History   Administered Date(s) Administered    COVID-19 (PFIZER) BIVALENT 12+YRS 11/06/2022    COVID-19 (PFIZER) Purple Cap Monovalent 02/23/2021, 03/16/2021, 10/10/2021    COVID-19 F23 (PFIZER) 12YRS+ (COMIRNATY) 12/08/2023    Fluad Quad 65+ 11/06/2022    Fluzone High-Dose 65+yrs 11/01/2020, 11/22/2021    Pneumococcal Conjugate 13-Valent (PCV13) 05/19/2017    Pneumococcal Polysaccharide (PPSV23) 09/01/2020       Family History   Problem Relation Age of Onset    No Known Problems Other        Cancer-related family history is not on file.    Social History     Tobacco Use    Smoking status: Never     Passive exposure: Never    Smokeless tobacco: Never   Vaping Use    Vaping Use: Never used   Substance Use Topics    Alcohol use: Yes     Comment: drinks alcohol occasionally; caffeine <1c qd    Drug use: No       ROS:    Review of Systems   Constitutional:  Positive for fatigue. Negative for chills and fever.   HENT:  Negative for congestion, drooling, ear discharge, rhinorrhea, sinus  "pressure and tinnitus.    Eyes:  Negative for photophobia, pain and discharge.   Respiratory:  Negative for apnea, choking and stridor.    Cardiovascular:  Negative for palpitations.   Gastrointestinal:  Negative for abdominal distention, abdominal pain and anal bleeding.   Endocrine: Negative for polydipsia and polyphagia.   Genitourinary:  Negative for decreased urine volume, flank pain and genital sores.   Musculoskeletal:  Negative for gait problem, neck pain and neck stiffness.   Skin:  Negative for color change, rash and wound.   Neurological:  Negative for tremors, seizures, syncope, facial asymmetry and speech difficulty.   Hematological:  Negative for adenopathy.   Psychiatric/Behavioral:  Negative for agitation, confusion, hallucinations and self-injury. The patient is not hyperactive.        Objective:    Vitals:    02/23/24 1132   BP: 134/83   Pulse: 77   Resp: 18   Temp: 98 °F (36.7 °C)   TempSrc: Infrared   SpO2: 97%   Weight: 79.8 kg (176 lb)   Height: 167.6 cm (66\")   PainSc: 0-No pain     Body mass index is 28.41 kg/m².    ECOG    (0) Fully active, able to carry on all predisease performance without restriction    Physical Exam:  Physical Exam  Vitals and nursing note reviewed.   Constitutional:       General: She is not in acute distress.     Appearance: She is not diaphoretic.   HENT:      Head: Normocephalic and atraumatic.   Eyes:      General: No scleral icterus.        Right eye: No discharge.         Left eye: No discharge.      Conjunctiva/sclera: Conjunctivae normal.   Neck:      Thyroid: No thyromegaly.   Cardiovascular:      Rate and Rhythm: Normal rate and regular rhythm.      Heart sounds: Normal heart sounds.      No friction rub. No gallop.   Pulmonary:      Effort: Pulmonary effort is normal. No respiratory distress.      Breath sounds: No stridor. No wheezing.   Abdominal:      General: Bowel sounds are normal.      Palpations: Abdomen is soft. There is no mass.      Tenderness: " There is no abdominal tenderness. There is no guarding or rebound.   Musculoskeletal:         General: No tenderness. Normal range of motion.      Cervical back: Normal range of motion and neck supple.   Lymphadenopathy:      Cervical: No cervical adenopathy.   Skin:     General: Skin is warm.      Findings: No erythema or rash.   Neurological:      Mental Status: She is alert and oriented to person, place, and time.      Motor: No abnormal muscle tone.   Psychiatric:         Behavior: Behavior normal.       RECENT LABS  WBC   Date Value Ref Range Status   02/23/2024 7.12 3.40 - 10.80 10*3/mm3 Final   06/05/2023 7.42 4.5 - 11.0 10*3/uL Final     RBC   Date Value Ref Range Status   02/23/2024 5.17 3.77 - 5.28 10*6/mm3 Final   06/05/2023 5.33 (H) 4.0 - 5.2 10*6/uL Final     Hemoglobin   Date Value Ref Range Status   02/23/2024 15.0 12.0 - 15.9 g/dL Final   06/05/2023 15.0 12.0 - 16.0 g/dL Final     Hematocrit   Date Value Ref Range Status   02/23/2024 45.6 34.0 - 46.6 % Final   06/05/2023 46.8 (H) 36.0 - 46.0 % Final     MCV   Date Value Ref Range Status   02/23/2024 88.2 79.0 - 97.0 fL Final   06/05/2023 87.8 80.0 - 100.0 fL Final     MCH   Date Value Ref Range Status   02/23/2024 29.0 26.6 - 33.0 pg Final   06/05/2023 28.1 26.0 - 34.0 pg Final     MCHC   Date Value Ref Range Status   02/23/2024 32.9 31.5 - 35.7 g/dL Final   06/05/2023 32.1 31.0 - 37.0 g/dL Final     RDW   Date Value Ref Range Status   02/23/2024 13.8 12.3 - 15.4 % Final   06/05/2023 13.2 12.0 - 16.8 % Final     RDW-SD   Date Value Ref Range Status   02/23/2024 43.8 37.0 - 54.0 fl Final     MPV   Date Value Ref Range Status   02/23/2024 9.7 6.0 - 12.0 fL Final   06/05/2023 10.3 8.4 - 12.4 fL Final     Platelets   Date Value Ref Range Status   02/23/2024 332 140 - 450 10*3/mm3 Final   06/05/2023 400 140 - 440 10*3/uL Final     Neutrophil Rel %   Date Value Ref Range Status   06/05/2023 54.8 45 - 80 % Final     Neutrophil %   Date Value Ref Range  Status   02/23/2024 49.4 42.7 - 76.0 % Final     Lymphocyte Rel %   Date Value Ref Range Status   06/05/2023 34.6 15 - 50 % Final     Lymphocyte %   Date Value Ref Range Status   02/23/2024 36.8 19.6 - 45.3 % Final     Monocyte Rel %   Date Value Ref Range Status   06/05/2023 7.4 0 - 15 % Final     Monocyte %   Date Value Ref Range Status   02/23/2024 10.3 5.0 - 12.0 % Final     Eosinophil %   Date Value Ref Range Status   02/23/2024 3.2 0.3 - 6.2 % Final   06/05/2023 2.4 0 - 7 % Final     Basophil Rel %   Date Value Ref Range Status   06/05/2023 0.5 0 - 2 % Final     Basophil %   Date Value Ref Range Status   02/23/2024 0.3 0.0 - 1.5 % Final     Immature Grans %   Date Value Ref Range Status   06/05/2023 0.3 0.0 - 1.0 % Final     Neutrophils Absolute   Date Value Ref Range Status   06/05/2023 4.06 2.0 - 8.8 10*3/uL Final     Neutrophils, Absolute   Date Value Ref Range Status   02/23/2024 3.52 1.70 - 7.00 10*3/mm3 Final     Lymphocytes Absolute   Date Value Ref Range Status   06/05/2023 2.57 0.7 - 5.5 10*3/uL Final     Lymphocytes, Absolute   Date Value Ref Range Status   02/23/2024 2.62 0.70 - 3.10 10*3/mm3 Final     Monocytes Absolute   Date Value Ref Range Status   06/05/2023 0.55 0.0 - 1.7 10*3/uL Final     Monocytes, Absolute   Date Value Ref Range Status   02/23/2024 0.73 0.10 - 0.90 10*3/mm3 Final     Eosinophils Absolute   Date Value Ref Range Status   06/05/2023 0.18 0.0 - 0.8 10*3/uL Final     Eosinophils, Absolute   Date Value Ref Range Status   02/23/2024 0.23 0.00 - 0.40 10*3/mm3 Final     Basophils Absolute   Date Value Ref Range Status   06/05/2023 0.04 0.0 - 0.2 10*3/uL Final     Basophils, Absolute   Date Value Ref Range Status   02/23/2024 0.02 0.00 - 0.20 10*3/mm3 Final     Immature Grans, Absolute   Date Value Ref Range Status   06/05/2023 0.02 0.00 - 0.10 10*3/uL Final     nRBC   Date Value Ref Range Status   06/05/2023 0 0 /100(WBC) Final       Lab Results   Component Value Date    GLUCOSE 104  (H) 02/07/2023    BUN 17 02/07/2023    CREATININE 0.76 02/07/2023    EGFRIFNONA 81 11/22/2021    BCR 22.4 02/07/2023    K 4.4 02/07/2023    CO2 28.8 02/07/2023    CALCIUM 9.8 02/07/2023    ALBUMIN 4.5 02/07/2023    LABIL2 1.0 05/31/2019    AST 23 02/07/2023    ALT 26 02/07/2023         Assessment & Plan   Malignant neoplasm of upper-outer quadrant of right breast in female, estrogen receptor positive  - CBC & Differential      Poorly differentiated invasive ductal carcinoma of the right breast that was estrogen and progesterone receptor positive and HER-2/jovita negative.  ER was greater than 90% and NC was 90%.  HER-2/jovita was 1+ negative .  Status post double mastectomy with right sentinel lymph node biopsy T2 N1 M0.  This was in 2011  Status post adjuvant chemotherapy with dose dense AC followed by Taxotere.  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  Seen by Dr. Delgado with [facility] every 6 months.  June 2023 was diagnosis  Osteoporosis : On prolia  due  Feb 2024. Bone density is due for July 2024. Patient is on vitamin  and calcium         Plans      Will get additional oncology records from Dr. Fernandez's office  CMP today  Schedule patient for Prolia every 6 months.  Patient is due now  Schedule bone density due July 2024  Follow-up 6 months  All questions answered        Patient verbalized understanding and is in agreement of the above plan.              I spent 60 total minutes, face-to-face, caring for Edie today. 90% of this time involved counseling and/or coordination of care as documented within this note.

## 2024-02-23 ENCOUNTER — CONSULT (OUTPATIENT)
Dept: ONCOLOGY | Facility: CLINIC | Age: 74
End: 2024-02-23
Payer: MEDICARE

## 2024-02-23 ENCOUNTER — LAB (OUTPATIENT)
Dept: LAB | Facility: HOSPITAL | Age: 74
End: 2024-02-23
Payer: MEDICARE

## 2024-02-23 VITALS
RESPIRATION RATE: 18 BRPM | SYSTOLIC BLOOD PRESSURE: 134 MMHG | BODY MASS INDEX: 28.28 KG/M2 | OXYGEN SATURATION: 97 % | TEMPERATURE: 98 F | DIASTOLIC BLOOD PRESSURE: 83 MMHG | WEIGHT: 176 LBS | HEART RATE: 77 BPM | HEIGHT: 66 IN

## 2024-02-23 DIAGNOSIS — Z17.0 MALIGNANT NEOPLASM OF UPPER-OUTER QUADRANT OF RIGHT BREAST IN FEMALE, ESTROGEN RECEPTOR POSITIVE: Primary | ICD-10-CM

## 2024-02-23 DIAGNOSIS — C50.411 MALIGNANT NEOPLASM OF UPPER-OUTER QUADRANT OF RIGHT BREAST IN FEMALE, ESTROGEN RECEPTOR POSITIVE: Primary | ICD-10-CM

## 2024-02-23 DIAGNOSIS — Z78.0 POST-MENOPAUSAL: ICD-10-CM

## 2024-02-23 LAB
ALBUMIN SERPL-MCNC: 4.5 G/DL (ref 3.5–5.2)
ALBUMIN/GLOB SERPL: 1.3 G/DL
ALP SERPL-CCNC: 78 U/L (ref 39–117)
ALT SERPL W P-5'-P-CCNC: 17 U/L (ref 1–33)
ANION GAP SERPL CALCULATED.3IONS-SCNC: 9 MMOL/L (ref 5–15)
AST SERPL-CCNC: 30 U/L (ref 1–32)
BASOPHILS # BLD AUTO: 0.02 10*3/MM3 (ref 0–0.2)
BASOPHILS NFR BLD AUTO: 0.3 % (ref 0–1.5)
BILIRUB SERPL-MCNC: 0.4 MG/DL (ref 0–1.2)
BUN SERPL-MCNC: 18 MG/DL (ref 8–23)
BUN/CREAT SERPL: 23.4 (ref 7–25)
CALCIUM SPEC-SCNC: 9.8 MG/DL (ref 8.6–10.5)
CHLORIDE SERPL-SCNC: 101 MMOL/L (ref 98–107)
CO2 SERPL-SCNC: 31 MMOL/L (ref 22–29)
CREAT SERPL-MCNC: 0.77 MG/DL (ref 0.57–1)
DEPRECATED RDW RBC AUTO: 43.8 FL (ref 37–54)
EGFRCR SERPLBLD CKD-EPI 2021: 82.1 ML/MIN/1.73
EOSINOPHIL # BLD AUTO: 0.23 10*3/MM3 (ref 0–0.4)
EOSINOPHIL NFR BLD AUTO: 3.2 % (ref 0.3–6.2)
ERYTHROCYTE [DISTWIDTH] IN BLOOD BY AUTOMATED COUNT: 13.8 % (ref 12.3–15.4)
GLOBULIN UR ELPH-MCNC: 3.4 GM/DL
GLUCOSE SERPL-MCNC: 99 MG/DL (ref 65–99)
HCT VFR BLD AUTO: 45.6 % (ref 34–46.6)
HGB BLD-MCNC: 15 G/DL (ref 12–15.9)
HOLD SPECIMEN: NORMAL
HOLD SPECIMEN: NORMAL
LYMPHOCYTES # BLD AUTO: 2.62 10*3/MM3 (ref 0.7–3.1)
LYMPHOCYTES NFR BLD AUTO: 36.8 % (ref 19.6–45.3)
MCH RBC QN AUTO: 29 PG (ref 26.6–33)
MCHC RBC AUTO-ENTMCNC: 32.9 G/DL (ref 31.5–35.7)
MCV RBC AUTO: 88.2 FL (ref 79–97)
MONOCYTES # BLD AUTO: 0.73 10*3/MM3 (ref 0.1–0.9)
MONOCYTES NFR BLD AUTO: 10.3 % (ref 5–12)
NEUTROPHILS NFR BLD AUTO: 3.52 10*3/MM3 (ref 1.7–7)
NEUTROPHILS NFR BLD AUTO: 49.4 % (ref 42.7–76)
PLATELET # BLD AUTO: 332 10*3/MM3 (ref 140–450)
PMV BLD AUTO: 9.7 FL (ref 6–12)
POTASSIUM SERPL-SCNC: 4.1 MMOL/L (ref 3.5–5.2)
PROT SERPL-MCNC: 7.9 G/DL (ref 6–8.5)
RBC # BLD AUTO: 5.17 10*6/MM3 (ref 3.77–5.28)
SODIUM SERPL-SCNC: 141 MMOL/L (ref 136–145)
WBC NRBC COR # BLD AUTO: 7.12 10*3/MM3 (ref 3.4–10.8)

## 2024-02-23 PROCEDURE — 36415 COLL VENOUS BLD VENIPUNCTURE: CPT

## 2024-02-23 PROCEDURE — 85025 COMPLETE CBC W/AUTO DIFF WBC: CPT

## 2024-02-23 PROCEDURE — 80053 COMPREHEN METABOLIC PANEL: CPT | Performed by: INTERNAL MEDICINE

## 2024-02-26 ENCOUNTER — PATIENT ROUNDING (BHMG ONLY) (OUTPATIENT)
Dept: ONCOLOGY | Facility: CLINIC | Age: 74
End: 2024-02-26
Payer: MEDICARE

## 2024-03-06 ENCOUNTER — TELEPHONE (OUTPATIENT)
Dept: ONCOLOGY | Facility: CLINIC | Age: 74
End: 2024-03-06

## 2024-03-06 NOTE — TELEPHONE ENCOUNTER
Caller: Edie Byrnes    Relationship to patient: Self    Best call back number: [phone]    Chief complaint: PT WANTED TO SEE IF THERE WAS AN EARLIER TIME    Type of visit: LAB    Requested date: 3-8-24 EARLIER IN THE MORNING      If rescheduling, when is the original appointment: 3-8-24     Additional notes:PT ALSO WANTED TO SEE IF SHE IS DUE FOR HER BONE DENSITY

## 2024-03-21 DIAGNOSIS — M85.89 OSTEOPENIA OF MULTIPLE SITES: Primary | ICD-10-CM

## 2024-03-22 ENCOUNTER — HOSPITAL ENCOUNTER (OUTPATIENT)
Dept: ONCOLOGY | Facility: HOSPITAL | Age: 74
Discharge: HOME | End: 2024-03-22
Payer: MEDICARE

## 2024-03-22 ENCOUNTER — LAB (OUTPATIENT)
Dept: LAB | Facility: HOSPITAL | Age: 74
End: 2024-03-22
Payer: MEDICARE

## 2024-03-22 DIAGNOSIS — M85.89 OSTEOPENIA OF MULTIPLE SITES: ICD-10-CM

## 2024-03-22 DIAGNOSIS — M85.89 OSTEOPENIA OF MULTIPLE SITES: Primary | ICD-10-CM

## 2024-03-22 LAB
ALBUMIN SERPL-MCNC: 4.3 G/DL (ref 3.5–5.2)
ALBUMIN/GLOB SERPL: 1.5 G/DL
ALP SERPL-CCNC: 83 U/L (ref 39–117)
ALT SERPL W P-5'-P-CCNC: 21 U/L (ref 1–33)
ANION GAP SERPL CALCULATED.3IONS-SCNC: 10 MMOL/L (ref 5–15)
AST SERPL-CCNC: 24 U/L (ref 1–32)
BASOPHILS # BLD AUTO: 0.02 10*3/MM3 (ref 0–0.2)
BASOPHILS NFR BLD AUTO: 0.3 % (ref 0–1.5)
BILIRUB SERPL-MCNC: 0.3 MG/DL (ref 0–1.2)
BUN SERPL-MCNC: 18 MG/DL (ref 8–23)
BUN/CREAT SERPL: 23.1 (ref 7–25)
CALCIUM SPEC-SCNC: 9.4 MG/DL (ref 8.6–10.5)
CHLORIDE SERPL-SCNC: 103 MMOL/L (ref 98–107)
CO2 SERPL-SCNC: 28 MMOL/L (ref 22–29)
CREAT SERPL-MCNC: 0.78 MG/DL (ref 0.57–1)
DEPRECATED RDW RBC AUTO: 46.1 FL (ref 37–54)
EGFRCR SERPLBLD CKD-EPI 2021: 80.8 ML/MIN/1.73
EOSINOPHIL # BLD AUTO: 0.32 10*3/MM3 (ref 0–0.4)
EOSINOPHIL NFR BLD AUTO: 4.7 % (ref 0.3–6.2)
ERYTHROCYTE [DISTWIDTH] IN BLOOD BY AUTOMATED COUNT: 14.2 % (ref 12.3–15.4)
GLOBULIN UR ELPH-MCNC: 2.9 GM/DL
GLUCOSE SERPL-MCNC: 125 MG/DL (ref 65–99)
HCT VFR BLD AUTO: 45.5 % (ref 34–46.6)
HGB BLD-MCNC: 14.5 G/DL (ref 12–15.9)
LYMPHOCYTES # BLD AUTO: 2.33 10*3/MM3 (ref 0.7–3.1)
LYMPHOCYTES NFR BLD AUTO: 34.3 % (ref 19.6–45.3)
MAGNESIUM SERPL-MCNC: 1.8 MG/DL (ref 1.6–2.4)
MCH RBC QN AUTO: 28.8 PG (ref 26.6–33)
MCHC RBC AUTO-ENTMCNC: 31.9 G/DL (ref 31.5–35.7)
MCV RBC AUTO: 90.5 FL (ref 79–97)
MONOCYTES # BLD AUTO: 0.52 10*3/MM3 (ref 0.1–0.9)
MONOCYTES NFR BLD AUTO: 7.6 % (ref 5–12)
NEUTROPHILS NFR BLD AUTO: 3.61 10*3/MM3 (ref 1.7–7)
NEUTROPHILS NFR BLD AUTO: 53.1 % (ref 42.7–76)
PHOSPHATE SERPL-MCNC: 2.7 MG/DL (ref 2.5–4.5)
PLATELET # BLD AUTO: 358 10*3/MM3 (ref 140–450)
PMV BLD AUTO: 10 FL (ref 6–12)
POTASSIUM SERPL-SCNC: 4.3 MMOL/L (ref 3.5–5.2)
PROT SERPL-MCNC: 7.2 G/DL (ref 6–8.5)
RBC # BLD AUTO: 5.03 10*6/MM3 (ref 3.77–5.28)
SODIUM SERPL-SCNC: 141 MMOL/L (ref 136–145)
WBC NRBC COR # BLD AUTO: 6.8 10*3/MM3 (ref 3.4–10.8)

## 2024-03-22 PROCEDURE — 96372 THER/PROPH/DIAG INJ SC/IM: CPT

## 2024-03-22 PROCEDURE — 36415 COLL VENOUS BLD VENIPUNCTURE: CPT

## 2024-03-22 PROCEDURE — 25010000002 DENOSUMAB 60 MG/ML SOLUTION PREFILLED SYRINGE: Performed by: INTERNAL MEDICINE

## 2024-03-22 PROCEDURE — 83735 ASSAY OF MAGNESIUM: CPT | Performed by: INTERNAL MEDICINE

## 2024-03-22 PROCEDURE — 85025 COMPLETE CBC W/AUTO DIFF WBC: CPT

## 2024-03-22 PROCEDURE — 84100 ASSAY OF PHOSPHORUS: CPT | Performed by: INTERNAL MEDICINE

## 2024-03-22 PROCEDURE — 80053 COMPREHEN METABOLIC PANEL: CPT | Performed by: INTERNAL MEDICINE

## 2024-03-22 RX ADMIN — DENOSUMAB 60 MG: 60 INJECTION SUBCUTANEOUS at 10:08

## 2024-04-01 RX ORDER — GABAPENTIN 300 MG/1
CAPSULE ORAL
Qty: 90 CAPSULE | Refills: 1 | Status: SHIPPED | OUTPATIENT
Start: 2024-04-01

## 2024-04-08 ENCOUNTER — TELEPHONE (OUTPATIENT)
Dept: FAMILY MEDICINE CLINIC | Facility: CLINIC | Age: 74
End: 2024-04-08
Payer: MEDICARE

## 2024-04-08 DIAGNOSIS — Z12.11 SCREEN FOR COLON CANCER: Primary | ICD-10-CM

## 2024-04-08 NOTE — TELEPHONE ENCOUNTER
Caller: Edie Byrnes    Relationship: Self    Best call back number: [phone]    What orders are you requesting (i.e. lab or imaging): COLOGUARD TEST    In what timeframe would the patient need to come in:     Where will you receive your lab/imaging services: AT HOME    Additional notes:

## 2024-05-28 RX ORDER — GABAPENTIN 300 MG/1
CAPSULE ORAL
Qty: 90 CAPSULE | Refills: 1 | Status: SHIPPED | OUTPATIENT
Start: 2024-05-28

## 2024-07-19 ENCOUNTER — HOSPITAL ENCOUNTER (OUTPATIENT)
Dept: BONE DENSITY | Facility: HOSPITAL | Age: 74
Discharge: HOME | End: 2024-07-19
Payer: MEDICARE

## 2024-07-19 DIAGNOSIS — Z78.0 POST-MENOPAUSAL: ICD-10-CM

## 2024-07-26 RX ORDER — GABAPENTIN 300 MG/1
CAPSULE ORAL
Qty: 90 CAPSULE | Refills: 1 | Status: SHIPPED | OUTPATIENT
Start: 2024-07-26

## 2024-09-20 RX ORDER — GABAPENTIN 300 MG/1
CAPSULE ORAL
Qty: 90 CAPSULE | Refills: 1 | Status: SHIPPED | OUTPATIENT
Start: 2024-09-20

## 2024-09-27 ENCOUNTER — HOSPITAL ENCOUNTER (OUTPATIENT)
Dept: ONCOLOGY | Facility: HOSPITAL | Age: 74
Discharge: HOME | End: 2024-09-27
Payer: MEDICARE

## 2024-09-27 ENCOUNTER — LAB (OUTPATIENT)
Dept: LAB | Facility: HOSPITAL | Age: 74
End: 2024-09-27
Payer: MEDICARE

## 2024-09-27 ENCOUNTER — OFFICE VISIT (OUTPATIENT)
Dept: ONCOLOGY | Facility: CLINIC | Age: 74
End: 2024-09-27
Payer: MEDICARE

## 2024-09-27 VITALS
HEIGHT: 66 IN | TEMPERATURE: 98 F | BODY MASS INDEX: 27.97 KG/M2 | OXYGEN SATURATION: 99 % | WEIGHT: 174 LBS | SYSTOLIC BLOOD PRESSURE: 123 MMHG | DIASTOLIC BLOOD PRESSURE: 77 MMHG | HEART RATE: 76 BPM

## 2024-09-27 DIAGNOSIS — Z17.0 MALIGNANT NEOPLASM OF UPPER-OUTER QUADRANT OF RIGHT BREAST IN FEMALE, ESTROGEN RECEPTOR POSITIVE: Primary | ICD-10-CM

## 2024-09-27 DIAGNOSIS — C50.411 MALIGNANT NEOPLASM OF UPPER-OUTER QUADRANT OF RIGHT BREAST IN FEMALE, ESTROGEN RECEPTOR POSITIVE: Primary | ICD-10-CM

## 2024-09-27 DIAGNOSIS — M85.89 OSTEOPENIA OF MULTIPLE SITES: ICD-10-CM

## 2024-09-27 DIAGNOSIS — G62.9 NEUROPATHY: ICD-10-CM

## 2024-09-27 LAB
ALBUMIN SERPL-MCNC: 4.4 G/DL (ref 3.5–5.2)
ALBUMIN/GLOB SERPL: 1.5 G/DL
ALP SERPL-CCNC: 72 U/L (ref 39–117)
ALT SERPL W P-5'-P-CCNC: 18 U/L (ref 1–33)
ANION GAP SERPL CALCULATED.3IONS-SCNC: 8.9 MMOL/L (ref 5–15)
AST SERPL-CCNC: 23 U/L (ref 1–32)
BASOPHILS # BLD AUTO: 0.02 10*3/MM3 (ref 0–0.2)
BASOPHILS NFR BLD AUTO: 0.3 % (ref 0–1.5)
BILIRUB SERPL-MCNC: 0.4 MG/DL (ref 0–1.2)
BUN SERPL-MCNC: 20 MG/DL (ref 8–23)
BUN/CREAT SERPL: 25 (ref 7–25)
CALCIUM SPEC-SCNC: 9.5 MG/DL (ref 8.6–10.5)
CHLORIDE SERPL-SCNC: 101 MMOL/L (ref 98–107)
CO2 SERPL-SCNC: 30.1 MMOL/L (ref 22–29)
CREAT SERPL-MCNC: 0.8 MG/DL (ref 0.57–1)
DEPRECATED RDW RBC AUTO: 45 FL (ref 37–54)
EGFRCR SERPLBLD CKD-EPI 2021: 78.4 ML/MIN/1.73
EOSINOPHIL # BLD AUTO: 0.33 10*3/MM3 (ref 0–0.4)
EOSINOPHIL NFR BLD AUTO: 4.5 % (ref 0.3–6.2)
ERYTHROCYTE [DISTWIDTH] IN BLOOD BY AUTOMATED COUNT: 13.7 % (ref 12.3–15.4)
GLOBULIN UR ELPH-MCNC: 2.9 GM/DL
GLUCOSE SERPL-MCNC: 138 MG/DL (ref 65–99)
HCT VFR BLD AUTO: 45.5 % (ref 34–46.6)
HGB BLD-MCNC: 14.6 G/DL (ref 12–15.9)
HOLD SPECIMEN: NORMAL
LYMPHOCYTES # BLD AUTO: 2.42 10*3/MM3 (ref 0.7–3.1)
LYMPHOCYTES NFR BLD AUTO: 32.7 % (ref 19.6–45.3)
MCH RBC QN AUTO: 29.1 PG (ref 26.6–33)
MCHC RBC AUTO-ENTMCNC: 32.1 G/DL (ref 31.5–35.7)
MCV RBC AUTO: 90.6 FL (ref 79–97)
MONOCYTES # BLD AUTO: 0.51 10*3/MM3 (ref 0.1–0.9)
MONOCYTES NFR BLD AUTO: 6.9 % (ref 5–12)
NEUTROPHILS NFR BLD AUTO: 4.11 10*3/MM3 (ref 1.7–7)
NEUTROPHILS NFR BLD AUTO: 55.6 % (ref 42.7–76)
PLATELET # BLD AUTO: 339 10*3/MM3 (ref 140–450)
PMV BLD AUTO: 9.8 FL (ref 6–12)
POTASSIUM SERPL-SCNC: 4 MMOL/L (ref 3.5–5.2)
PROT SERPL-MCNC: 7.3 G/DL (ref 6–8.5)
RBC # BLD AUTO: 5.02 10*6/MM3 (ref 3.77–5.28)
SODIUM SERPL-SCNC: 140 MMOL/L (ref 136–145)
VIT B12 BLD-MCNC: 1766 PG/ML (ref 211–946)
WBC NRBC COR # BLD AUTO: 7.39 10*3/MM3 (ref 3.4–10.8)

## 2024-09-27 PROCEDURE — 80053 COMPREHEN METABOLIC PANEL: CPT | Performed by: NURSE PRACTITIONER

## 2024-09-27 PROCEDURE — 82607 VITAMIN B-12: CPT | Performed by: NURSE PRACTITIONER

## 2024-09-27 PROCEDURE — 85025 COMPLETE CBC W/AUTO DIFF WBC: CPT

## 2024-09-27 PROCEDURE — 36415 COLL VENOUS BLD VENIPUNCTURE: CPT

## 2024-10-29 NOTE — PROGRESS NOTES
February 26, 2024    Hello, may I speak with Edie Byrnes?    My name is Maria A Coto      I am  with [facility]  [address] [place] [address] [phone].    Before we get started may I verify your date of birth? 1950    I am calling to officially welcome you to our practice and ask about your recent visit. Is this a good time to talk? no    Tell me about your visit with us. What things went well?  A My Chart message was sent to the patient.         We're always looking for ways to make our patients' experiences even better. Do you have recommendations on ways we may improve?  no    Overall were you satisfied with your first visit to our practice? yes       I appreciate you taking the time to speak with me today. Is there anything else I can do for you? no      Thank you, and have a great day.       [[id number]],[[id number]] [[id number]],[[id number]],[[id number]]

## 2024-12-30 RX ORDER — GABAPENTIN 300 MG/1
CAPSULE ORAL
Qty: 90 CAPSULE | Refills: 1 | Status: SHIPPED | OUTPATIENT
Start: 2024-12-30

## 2025-02-21 ENCOUNTER — OFFICE VISIT (OUTPATIENT)
Dept: FAMILY MEDICINE CLINIC | Facility: CLINIC | Age: 75
End: 2025-02-21
Payer: MEDICARE

## 2025-02-21 ENCOUNTER — LAB (OUTPATIENT)
Dept: FAMILY MEDICINE CLINIC | Facility: CLINIC | Age: 75
End: 2025-02-21
Payer: MEDICARE

## 2025-02-21 VITALS
BODY MASS INDEX: 28.77 KG/M2 | WEIGHT: 179 LBS | OXYGEN SATURATION: 95 % | HEIGHT: 66 IN | RESPIRATION RATE: 18 BRPM | HEART RATE: 81 BPM | SYSTOLIC BLOOD PRESSURE: 138 MMHG | DIASTOLIC BLOOD PRESSURE: 83 MMHG | TEMPERATURE: 97.3 F

## 2025-02-21 DIAGNOSIS — R73.01 FASTING HYPERGLYCEMIA: ICD-10-CM

## 2025-02-21 DIAGNOSIS — I10 ESSENTIAL HYPERTENSION: ICD-10-CM

## 2025-02-21 DIAGNOSIS — S90.129A CONTUSION OF FIFTH TOE: ICD-10-CM

## 2025-02-21 DIAGNOSIS — Z00.00 MEDICARE ANNUAL WELLNESS VISIT, SUBSEQUENT: Primary | ICD-10-CM

## 2025-02-21 DIAGNOSIS — Z12.11 SCREEN FOR COLON CANCER: ICD-10-CM

## 2025-02-21 DIAGNOSIS — Z23 NEED FOR INFLUENZA VACCINATION: ICD-10-CM

## 2025-02-21 PROBLEM — Z11.59 NEED FOR HEPATITIS C SCREENING TEST: Status: RESOLVED | Noted: 2021-11-22 | Resolved: 2025-02-21

## 2025-02-21 PROBLEM — L03.113 CELLULITIS OF RIGHT HAND: Status: RESOLVED | Noted: 2020-02-24 | Resolved: 2025-02-21

## 2025-02-21 LAB
ALBUMIN SERPL-MCNC: 4.3 G/DL (ref 3.5–5.2)
ALBUMIN/GLOB SERPL: 1.3 G/DL
ALP SERPL-CCNC: 149 U/L (ref 39–117)
ALT SERPL W P-5'-P-CCNC: 23 U/L (ref 1–33)
ANION GAP SERPL CALCULATED.3IONS-SCNC: 10.7 MMOL/L (ref 5–15)
AST SERPL-CCNC: 24 U/L (ref 1–32)
BILIRUB SERPL-MCNC: 0.4 MG/DL (ref 0–1.2)
BUN SERPL-MCNC: 20 MG/DL (ref 8–23)
BUN/CREAT SERPL: 24.1 (ref 7–25)
CALCIUM SPEC-SCNC: 9.8 MG/DL (ref 8.6–10.5)
CHLORIDE SERPL-SCNC: 100 MMOL/L (ref 98–107)
CHOLEST SERPL-MCNC: 179 MG/DL (ref 0–200)
CO2 SERPL-SCNC: 28.3 MMOL/L (ref 22–29)
CREAT SERPL-MCNC: 0.83 MG/DL (ref 0.57–1)
EGFRCR SERPLBLD CKD-EPI 2021: 74.5 ML/MIN/1.73
GLOBULIN UR ELPH-MCNC: 3.3 GM/DL
GLUCOSE SERPL-MCNC: 105 MG/DL (ref 65–99)
HDLC SERPL-MCNC: 69 MG/DL (ref 40–60)
LDLC SERPL CALC-MCNC: 97 MG/DL (ref 0–100)
LDLC/HDLC SERPL: 1.4 {RATIO}
POTASSIUM SERPL-SCNC: 4.4 MMOL/L (ref 3.5–5.2)
PROT SERPL-MCNC: 7.6 G/DL (ref 6–8.5)
SODIUM SERPL-SCNC: 139 MMOL/L (ref 136–145)
TRIGL SERPL-MCNC: 68 MG/DL (ref 0–150)
VLDLC SERPL-MCNC: 13 MG/DL (ref 5–40)

## 2025-02-21 PROCEDURE — 80061 LIPID PANEL: CPT | Performed by: FAMILY MEDICINE

## 2025-02-21 PROCEDURE — 80053 COMPREHEN METABOLIC PANEL: CPT | Performed by: FAMILY MEDICINE

## 2025-02-21 PROCEDURE — 36415 COLL VENOUS BLD VENIPUNCTURE: CPT | Performed by: FAMILY MEDICINE

## 2025-02-21 NOTE — PATIENT INSTRUCTIONS
Medicare Wellness  Personal Prevention Plan of Service     Date of Office Visit:    Encounter Provider:  Regina Gan MD  Place of Service:  [facility]  Patient Name: Edie Byrnes  :  1950    As part of the Medicare Wellness portion of your visit today, we are providing you with this personalized preventive plan of services (PPPS). This plan is based upon recommendations of the [place] Preventive Services Task Force (USPSTF) and the Advisory Committee on Immunization Practices (ACIP).    This lists the preventive care services that should be considered, and provides dates of when you are due. Items listed as completed are up-to-date and do not require any further intervention.    Health Maintenance   Topic Date Due    TDAP/TD VACCINES (1 - Tdap) Never done    ZOSTER VACCINE (1 of 2) Never done    COLORECTAL CANCER SCREENING  2022    INFLUENZA VACCINE  2025 (Originally 2024)    COVID-19 Vaccine (2024- season) 2025 (Originally 2024)    BMI FOLLOWUP  2025    ANNUAL WELLNESS VISIT  2026    DXA SCAN  2026    HEPATITIS C SCREENING  Completed    Pneumococcal Vaccine 50+  Completed       Orders Placed This Encounter   Procedures    Cologuard - Stool, Per Rectum     Standing Status:   Future     Standing Expiration Date:   2026     Order Specific Question:   Release to patient     Answer:   Routine Release [[id number]]    Comprehensive Metabolic Panel     Order Specific Question:   Release to patient     Answer:   Routine Release [[id number]]    Lipid Panel     Order Specific Question:   Release to patient     Answer:   Routine Release [[id number]]    Ambulatory Referral to Podiatry     Referral Priority:   Routine     Referral Type:   Consultation     Referral Reason:   Specialty Services Required     Requested Specialty:   Podiatry     Number of Visits Requested:   1       Return in about 1 year (around  2/21/2026) for Medicare Wellness.

## 2025-02-21 NOTE — PROGRESS NOTES
Injection  Injection performed in LEFT DELTOID by Josh Javier MA. Patient tolerated the procedure well without complications.    Patient Supplied: NO    FLUZONE HD   NDC: 71853-953-48  LOT: [id number]  EXP: 06/30/25 02/21/25   Josh Javier MA

## 2025-02-21 NOTE — PROGRESS NOTES
Subjective   The ABCs of the Annual Wellness Visit  Medicare Wellness Visit      Edie Byrnes is a 74 y.o. patient who presents for a Medicare Wellness Visit.    The following portions of the patient's history were reviewed and   updated as appropriate: allergies, current medications, past family history, past medical history, past social history, past surgical history, and problem list.    Compared to one year ago, the patient's physical   health is the same.  Compared to one year ago, the patient's mental   health is the same.    Recent Hospitalizations:  She was not admitted to the hospital during the last year.     Current Medical Providers:  Patient Care Team:  Regina Gan MD as PCP - General  Ines Jensen MD as Consulting Physician (Hematology and Oncology)  Moris Ulloa MD as Consulting Physician (Otolaryngology)    Outpatient Medications Prior to Visit   Medication Sig Dispense Refill    ALPRAZolam (XANAX) 0.25 MG tablet Take 1 tablet by mouth Daily As Needed for Anxiety. 30 tablet 0    ascorbic acid (VITAMIN C) 500 MG tablet Take 1 tablet by mouth Daily.      Calcium Carb-Cholecalciferol (CALCIUM + D3) 600-200 MG-UNIT tablet CALCIUM + D3 600-200 MG-UNIT TABS      cyanocobalamin 100 MCG tablet VITAMIN B12 TABS      Multiple Vitamins-Minerals (CENTRUM SILVER ULTRA WOMENS) tablet CENTRUM SILVER ULTRA WOMENS TABS      vitamin B-6 (PYRIDOXINE) 100 MG tablet VITAMIN B-6 100 MG TABS      gabapentin (NEURONTIN) 300 MG capsule TAKE ONE CAPSULE BY MOUTH THREE TIMES DAILY 90 capsule 1     No facility-administered medications prior to visit.     No opioid medication identified on active medication list. I have reviewed chart for other potential  high risk medication/s and harmful drug interactions in the elderly.      Aspirin is not on active medication list.  Aspirin use is not indicated based on review of current medical condition/s. Risk of harm outweighs potential benefits.   ".    Patient Active Problem List   Diagnosis    Malignant neoplasm of upper-outer quadrant of right breast in female, estrogen receptor positive    Osteopenia    Chronic NSAID use    Chronic back pain    Essential hypertension    Anxiety    Medicare annual wellness visit, subsequent    Family history of diabetes mellitus    Fasting hyperglycemia    Need for vaccination    Screen for colon cancer     Advance Care Planning Advance Directive is not on file.  ACP discussion was held with the patient during this visit. Patient does not have an advance directive, information provided.            Objective   Vitals:    02/21/25 0757 02/21/25 0812   BP: 141/83 138/83   BP Location: Left arm    Patient Position: Sitting    Cuff Size: Adult    Pulse: 81    Resp: 18    Temp: 97.3 °F (36.3 °C)    TempSrc: Temporal    SpO2: 95%    Weight: 81.2 kg (179 lb)    Height: 167.6 cm (66\")    PainSc: 0-No pain        Estimated body mass index is 28.89 kg/m² as calculated from the following:    Height as of this encounter: 167.6 cm (66\").    Weight as of this encounter: 81.2 kg (179 lb).  Physical Exam  Vitals and nursing note reviewed.   Constitutional:       General: She is not in acute distress.     Appearance: She is well-developed.   HENT:      Head: Normocephalic.   Eyes:      General: Lids are normal.      Conjunctiva/sclera: Conjunctivae normal.   Neck:      Thyroid: No thyroid mass or thyromegaly.      Trachea: Trachea normal.   Cardiovascular:      Rate and Rhythm: Normal rate and regular rhythm.      Heart sounds: Normal heart sounds.   Pulmonary:      Effort: Pulmonary effort is normal.      Breath sounds: Normal breath sounds.   Abdominal:      Palpations: Abdomen is soft.   Musculoskeletal:      Cervical back: Normal range of motion.   Lymphadenopathy:      Cervical: No cervical adenopathy.   Skin:     General: Skin is warm and dry.   Neurological:      Mental Status: She is alert and oriented to person, place, and time. "   Psychiatric:         Attention and Perception: She is attentive.         Mood and Affect: Mood normal.         Speech: Speech normal.         Behavior: Behavior normal.                     Does the patient have evidence of cognitive impairment? No  Lab Results   Component Value Date    TRIG 68 2025    HDL 69 (H) 2025    LDL 97 2025    VLDL 13 2025                                                                                               Health  Risk Assessment    Smoking Status:  Social History     Tobacco Use   Smoking Status Never    Passive exposure: Never   Smokeless Tobacco Never     Alcohol Consumption:  Social History     Substance and Sexual Activity   Alcohol Use Yes    Comment: glass of wine on occasion       Fall Risk Screen  STEADI Fall Risk Assessment was completed, and patient is at LOW risk for falls.Assessment completed on:2025    Depression Screening   Little interest or pleasure in doing things? Not at all   Feeling down, depressed, or hopeless? Not at all   PHQ-2 Total Score 0      Health Habits and Functional and Cognitive Screenin/21/2025     8:01 AM   Functional & Cognitive Status   Do you have difficulty preparing food and eating? No   Do you have difficulty bathing yourself, getting dressed or grooming yourself? No   Do you have difficulty using the toilet? No   Do you have difficulty moving around from place to place? No   Do you have trouble with steps or getting out of a bed or a chair? No   Current Diet Well Balanced Diet   Dental Exam Up to date   Eye Exam Not up to date   Exercise (times per week) 3 times per week   Current Exercises Include Treadmill;Walking   Do you need help using the phone?  No   Are you deaf or do you have serious difficulty hearing?  No   Do you need help to go to places out of walking distance? No   Do you need help shopping? No   Do you need help preparing meals?  No   Do you need help with housework?  No   Do you need  help with laundry? No   Do you need help taking your medications? No   Do you need help managing money? No   Do you ever drive or ride in a car without wearing a seat belt? No   Have you felt unusual stress, anger or loneliness in the last month? No   Who do you live with? Alone   If you need help, do you have trouble finding someone available to you? No   Have you been bothered in the last four weeks by sexual problems? No   Do you have difficulty concentrating, remembering or making decisions? No           Age-appropriate Screening Schedule:  Refer to the list below for future screening recommendations based on patient's age, sex and/or medical conditions. Orders for these recommended tests are listed in the plan section. The patient has been provided with a written plan.    Health Maintenance List  Health Maintenance   Topic Date Due    TDAP/TD VACCINES (1 - Tdap) Never done    ZOSTER VACCINE (1 of 2) Never done    COLORECTAL CANCER SCREENING  01/02/2022    COVID-19 Vaccine (6 - 2024-25 season) 05/13/2025 (Originally 9/1/2024)    BMI FOLLOWUP  09/27/2025    ANNUAL WELLNESS VISIT  02/21/2026    DXA SCAN  07/19/2026    HEPATITIS C SCREENING  Completed    INFLUENZA VACCINE  Completed    Pneumococcal Vaccine 50+  Completed                                                                                                                                                CMS Preventative Services Quick Reference  Risk Factors Identified During Encounter  Inactivity/Sedentary: Patient was advised to exercise at least 150 minutes a week per CDC recommendations.  Dental Screening Recommended  Vision Screening Recommended    The above risks/problems have been discussed with the patient.  Pertinent information has been shared with the patient in the After Visit Summary.  An After Visit Summary and PPPS were made available to the patient.    Follow Up:   Next Medicare Wellness visit to be scheduled in 1 year.     Assessment &  Plan  Medicare annual wellness visit, subsequent         Contusion of fifth toe    Orders:    Ambulatory Referral to Podiatry    Screen for colon cancer    Orders:    Cologuard - Stool, Per Rectum; Future    Fasting hyperglycemia    Orders:    Comprehensive Metabolic Panel    Lipid Panel    Essential hypertension  Hypertension is stable and controlled  Continue current treatment regimen.  Blood pressure will be reassessed in 6 months.    Orders:    Comprehensive Metabolic Panel    Lipid Panel    Need for influenza vaccination    Orders:    Fluzone High-Dose 65+yrs         Follow Up:   Return in about 1 year (around 2/21/2026) for Medicare Wellness.

## 2025-02-28 ENCOUNTER — OFFICE VISIT (OUTPATIENT)
Age: 75
End: 2025-02-28
Payer: MEDICARE

## 2025-02-28 VITALS — HEIGHT: 66 IN | WEIGHT: 179 LBS | BODY MASS INDEX: 28.77 KG/M2 | OXYGEN SATURATION: 96 % | HEART RATE: 103 BPM

## 2025-02-28 DIAGNOSIS — M20.41 HAMMER TOES OF BOTH FEET: ICD-10-CM

## 2025-02-28 DIAGNOSIS — T45.1X5A PERIPHERAL NEUROPATHY DUE TO CHEMOTHERAPY: ICD-10-CM

## 2025-02-28 DIAGNOSIS — B35.1 ONYCHOMYCOSIS: ICD-10-CM

## 2025-02-28 DIAGNOSIS — S90.121A HEMATOMA OF TOE OF RIGHT FOOT, INITIAL ENCOUNTER: ICD-10-CM

## 2025-02-28 DIAGNOSIS — G62.0 PERIPHERAL NEUROPATHY DUE TO CHEMOTHERAPY: ICD-10-CM

## 2025-02-28 DIAGNOSIS — M20.42 HAMMER TOES OF BOTH FEET: ICD-10-CM

## 2025-02-28 DIAGNOSIS — M79.671 RIGHT FOOT PAIN: Primary | ICD-10-CM

## 2025-02-28 RX ORDER — GABAPENTIN 300 MG/1
CAPSULE ORAL
Qty: 90 CAPSULE | Refills: 1 | Status: SHIPPED | OUTPATIENT
Start: 2025-02-28

## 2025-02-28 NOTE — PROGRESS NOTES
02/28/2025  Foot and Ankle Surgery - New Patient   Provider: Dr. Jama Villegas DPM  Location: [facility]    Subjective:  Edie Byrnes is a 74 y.o. female.     Chief Complaint   Patient presents with    Right Foot - Initial Evaluation, Nail Problem     discoloration on 5th toe, has chemo related neuropathy, denies injury, nails    Left Foot - Nail Problem     nails    Initial Evaluation     PCP: Regina Gan MD  Last PCP Visit: 2/21/25       74-year-old female complaining of discoloration blister to right fifth digit.  Patient also complaining of nail changes to bilateral great toenails.  Patient has peripheral neuropathy due to chemotherapy treatment several years ago.  Patient states she has had continued numbness and tingling to her bilateral feet.  Patient also states she is concerned about the coloration of her feet.  States they turn purple and dark after working or standing for long period of time.  Patient has compression stockings but does not wear them regularly.  Patient denies any trauma to her feet bilaterally.  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 Ulloa       Past Surgical History:   Procedure Laterality Date    BACK SURGERY  03/2013    low back surgery    BREAST SURGERY  2011    CERVICAL DISC ARTHROPLASTY  09/2010    repair a disc in her neck     COLONOSCOPY  2012    LAPAROSCOPIC CHOLECYSTECTOMY  07/2011    MASTECTOMY Bilateral 08/2011     "SPINE SURGERY  2010-neck, 2013-back       Family History   Problem Relation Age of Onset    No Known Problems Other     Diabetes Mother          1998    Vision loss Mother         Lost eyesight from diabetes    Diabetes Father         Type 2 diabetes,  3/21/1997       Social History     Socioeconomic History    Marital status: Single   Tobacco Use    Smoking status: Never     Passive exposure: Never    Smokeless tobacco: Never   Vaping Use    Vaping status: Never Used   Substance and Sexual Activity    Alcohol use: Yes     Comment: glass of wine on occasion    Drug use: No    Sexual activity: Not Currently        Current Outpatient Medications on File Prior to Visit   Medication Sig Dispense Refill    ALPRAZolam (XANAX) 0.25 MG tablet Take 1 tablet by mouth Daily As Needed for Anxiety. 30 tablet 0    ascorbic acid (VITAMIN C) 500 MG tablet Take 1 tablet by mouth Daily.      Calcium Carb-Cholecalciferol (CALCIUM + D3) 600-200 MG-UNIT tablet CALCIUM + D3 600-200 MG-UNIT TABS      cyanocobalamin 100 MCG tablet VITAMIN B12 TABS      Multiple Vitamins-Minerals (CENTRUM SILVER ULTRA WOMENS) tablet CENTRUM SILVER ULTRA WOMENS TABS      vitamin B-6 (PYRIDOXINE) 100 MG tablet VITAMIN B-6 100 MG TABS      [DISCONTINUED] gabapentin (NEURONTIN) 300 MG capsule TAKE ONE CAPSULE BY MOUTH THREE TIMES DAILY 90 capsule 1     No current facility-administered medications on file prior to visit.         Objective   Pulse 103   Ht 167.6 cm (66\")   Wt 81.2 kg (179 lb)   SpO2 96%   BMI 28.89 kg/m²     Foot/Ankle Exam    GENERAL  Appearance:  appears stated age  Orientation:  AAOx3  Affect:  appropriate  Gait:  unimpaired  Assistance:  independent  Right shoe gear: casual shoe  Left shoe gear: casual shoe    VASCULAR     Right Foot Vascularity   Dorsalis pedis:  2+  Posterior tibial:  2+  Skin temperature:  cool  Edema gradin+  CFT:  < 3 seconds  Pedal hair growth:  Present  Varicosities:  moderate varicosities     " Left Foot Vascularity   Dorsalis pedis:  2+  Posterior tibial:  2+  Skin temperature:  cool  Edema gradin+ and 1+  CFT:  < 3 seconds  Pedal hair growth:  Present  Varicosities:  moderate varicosities     NEUROLOGIC     Right Foot Neurologic   Light touch sensation: diminished  Vibratory sensation: normal  Hot/Cold sensation: normal  Normal reflexes    Achilles reflex:  2+  Babinski reflex:  2+     Left Foot Neurologic   Light touch sensation: diminished  Vibratory sensation: normal  Hot/Cold sensation:  normal  Normal reflexes    Achilles reflex:  2+  Babinski reflex:  2+    MUSCULOSKELETAL     Right Foot Musculoskeletal   Ecchymosis:  toe 5  Tenderness:  toe 5 tenderness    Hammertoe:  Second toe, third toe, fourth toe and fifth toe     Left Foot Musculoskeletal   Tenderness:  toe 1 tenderness  Hammertoe:  Second toe, third toe, fourth toe and fifth toe    MUSCLE STRENGTH     Right Foot Muscle Strength   Normal strength    Foot dorsiflexion:  5  Foot plantar flexion:  5  Foot inversion:  5  Foot eversion:  5     Left Foot Muscle Strength   Normal strength    Foot dorsiflexion:  5  Foot plantar flexion:  5  Foot inversion:  5  Foot eversion:  5    RANGE OF MOTION     Right Foot Range of Motion   Foot and ankle ROM within normal limits       Left Foot Range of Motion   Foot and ankle ROM within normal limits      DERMATOLOGIC      Right Foot Dermatologic   Skin  Positive for blister, skin changes and atrophy. (Duskiness to bilateral feet partially resolved with elevation)  Nails  1.  Normal. Positive for elongated, onychomycosis, abnormal thickness and subungual debris.  2.  Normal. Positive for elongated and abnormal thickness.  3.  Normal.  4.  Normal.  5.  Normal.     Left Foot Dermatologic   Skin  Positive for skin changes and atrophy. (Duskiness to bilateral feet partially resolved with elevation)   Nails  1.  Normal. Positive for elongated, onychomycosis, abnormal thickness, subungual debris and dystrophic  nail.  2.  Normal. Positive for elongated, abnormal thickness and dystrophic nail.  3.  Normal.  4.  Normal.  5.  Normal.    Three-view weightbearing x-rays right foot obtained today.  Impression: No fracture dislocation noted.  Hammertoe deformity present to lesser digits.  Minimal loss of joint space to first MPJ.  Patient has several accessory sesamoids to lesser metatarsal phalangeal joints.  Os peroneum present.    Assessment & Plan   Diagnoses and all orders for this visit:    1. Right foot pain (Primary)  -     XR Foot 3+ View Right    2. Hematoma of toe of right foot, initial encounter    3. Onychomycosis    4. Peripheral neuropathy due to chemotherapy    5. Hammer toes of both feet       Discussed with patient bilateral hammertoes, right fifth digit blister, onychomycosis changes to her great toenails.    Seroma to right fifth digit drained.  No cloudy fluid no signs of infection present.  Patient needs to dress with silicone pad to protect with Band-Aid.  Patient needs to monitor shoe gear for any rubbing extra pressure.    Patient would benefit from bilateral compression stockings.  She is having venous congestion which leads to her discoloration bilaterally.  Patient educated to purchase 10 to 15 mmHg compression stockings.    X-rays reviewed with patient.    Assessment and diagnosis of onychomycosis discussed with patient.  Treatment plans discussed in detail today including identifying risk factors, implementing topical versus systemic antifungal therapies, patient education on foot hygiene and self-care practices, and monitoring for treatment response and adverse effects.    Treatment options include oral antifungals, topical antifungals, nail debridements with potential nail removal.    Patient understands the risks associated with oral antifungal medication, including liver injury, interactions with other medications.    Patient like to move forward with topical nail fungal solution treatment    We  will prescribe alternatives topical nail solution    Patient understands treatment will require long-term commitment and will not see immediate results.      Reviewed proper basic stretching and manual therapy exercises along with appropriate shoes and activity.  Discussed proper use and/or avoidance of OTC anti-inflammatories.  Patient is to call with any additional issues or concerns.  Greater than 30 minutes was spent before, during, and after evaluation for patient care.                   Procedures     Wade Villegas DPM

## 2025-02-28 NOTE — TELEPHONE ENCOUNTER
Rx Refill Note  Requested Prescriptions     Pending Prescriptions Disp Refills    gabapentin (NEURONTIN) 300 MG capsule [Pharmacy Med Name: gabapentin 300 mg capsule] 90 capsule 1     Sig: TAKE ONE CAPSULE BY MOUTH THREE TIMES DAILY      Last office visit with prescribing clinician: 2/21/2025   Last telemedicine visit with prescribing clinician: Visit date not found   Next office visit with prescribing clinician: 2/27/2026                         Would you like a call back once the refill request has been completed: [] Yes [] No    If the office needs to give you a call back, can they leave a voicemail: [] Yes [] No    Prerna Smith MA  02/28/25, 08:55 EST

## 2025-03-13 ENCOUNTER — TELEPHONE (OUTPATIENT)
Dept: FAMILY MEDICINE CLINIC | Facility: CLINIC | Age: 75
End: 2025-03-13

## 2025-03-13 NOTE — TELEPHONE ENCOUNTER
Caller: Edie Byrnes    Relationship: Self    Best call back number: [phone]     What is the best time to reach you: ANYTIME    Who are you requesting to speak with (clinical staff, provider,  specific staff member): DR FREEDMAN    What was the call regarding: PATIENT IS REQUESTING TO KNOW WHO DR FREEDMAN WOULD RECOMMEND HER SEEING FOR DERMATOLOGY.    PATIENT STATED SHE SEEMS TO HAVE A MOLE ON HER BACK, AND SHE WOULD LIKE TO HAVE THIS CHECKED OUT BY A DERMATOLOGIST.    PLEASE CONTACT PATIENT TO ADVISE.    ARMINT OKAY PER PATIENT.    Is it okay if the provider responds through MyChart: YES

## 2025-03-16 NOTE — ASSESSMENT & PLAN NOTE
Hypertension is stable and controlled  Continue current treatment regimen.  Blood pressure will be reassessed in 6 months.    Orders:    Comprehensive Metabolic Panel    Lipid Panel

## 2025-03-28 ENCOUNTER — LAB (OUTPATIENT)
Dept: LAB | Facility: HOSPITAL | Age: 75
End: 2025-03-28
Payer: MEDICARE

## 2025-03-28 ENCOUNTER — OFFICE VISIT (OUTPATIENT)
Dept: ONCOLOGY | Facility: CLINIC | Age: 75
End: 2025-03-28
Payer: MEDICARE

## 2025-03-28 VITALS
RESPIRATION RATE: 18 BRPM | WEIGHT: 179 LBS | OXYGEN SATURATION: 96 % | HEART RATE: 84 BPM | DIASTOLIC BLOOD PRESSURE: 68 MMHG | TEMPERATURE: 98.3 F | HEIGHT: 66 IN | BODY MASS INDEX: 28.77 KG/M2 | SYSTOLIC BLOOD PRESSURE: 137 MMHG

## 2025-03-28 DIAGNOSIS — Z17.0 MALIGNANT NEOPLASM OF UPPER-OUTER QUADRANT OF RIGHT BREAST IN FEMALE, ESTROGEN RECEPTOR POSITIVE: Primary | ICD-10-CM

## 2025-03-28 DIAGNOSIS — C50.411 MALIGNANT NEOPLASM OF UPPER-OUTER QUADRANT OF RIGHT BREAST IN FEMALE, ESTROGEN RECEPTOR POSITIVE: Primary | ICD-10-CM

## 2025-03-28 LAB
BASOPHILS # BLD AUTO: 0.04 10*3/MM3 (ref 0–0.2)
BASOPHILS NFR BLD AUTO: 0.5 % (ref 0–1.5)
DEPRECATED RDW RBC AUTO: 44.2 FL (ref 37–54)
EOSINOPHIL # BLD AUTO: 0.62 10*3/MM3 (ref 0–0.4)
EOSINOPHIL NFR BLD AUTO: 8.2 % (ref 0.3–6.2)
ERYTHROCYTE [DISTWIDTH] IN BLOOD BY AUTOMATED COUNT: 13.8 % (ref 12.3–15.4)
HCT VFR BLD AUTO: 47.8 % (ref 34–46.6)
HGB BLD-MCNC: 15.6 G/DL (ref 12–15.9)
LYMPHOCYTES # BLD AUTO: 2.38 10*3/MM3 (ref 0.7–3.1)
LYMPHOCYTES NFR BLD AUTO: 31.6 % (ref 19.6–45.3)
MCH RBC QN AUTO: 29.1 PG (ref 26.6–33)
MCHC RBC AUTO-ENTMCNC: 32.6 G/DL (ref 31.5–35.7)
MCV RBC AUTO: 89 FL (ref 79–97)
MONOCYTES # BLD AUTO: 0.45 10*3/MM3 (ref 0.1–0.9)
MONOCYTES NFR BLD AUTO: 6 % (ref 5–12)
NEUTROPHILS NFR BLD AUTO: 4.04 10*3/MM3 (ref 1.7–7)
NEUTROPHILS NFR BLD AUTO: 53.7 % (ref 42.7–76)
PLATELET # BLD AUTO: 220 10*3/MM3 (ref 140–450)
PMV BLD AUTO: 11.5 FL (ref 6–12)
RBC # BLD AUTO: 5.37 10*6/MM3 (ref 3.77–5.28)
WBC NRBC COR # BLD AUTO: 7.53 10*3/MM3 (ref 3.4–10.8)

## 2025-03-28 PROCEDURE — 36415 COLL VENOUS BLD VENIPUNCTURE: CPT

## 2025-03-28 PROCEDURE — 85025 COMPLETE CBC W/AUTO DIFF WBC: CPT

## 2025-03-28 NOTE — PROGRESS NOTES
Hematology/Oncology Outpatient Follow Up    PATIENT NAME:Edie Byrnes  :1950  MRN: [id number]  PRIMARY CARE PHYSICIAN: Regina Gan MD  REFERRING PHYSICIAN: No ref. provider found    Chief Complaint   Patient presents with    Follow-up     Malignant neoplasm of upper-outer quadrant of right breast in female, estrogen receptor positive            HISTORY OF PRESENT ILLNESS:     This 73-year-old female has a history of breast cancer.       In the summer of  was experiencing abdominal pain and was diagnosed with gallbladder disease.  A CT scan was done on 2011 showing a right breast mass measuring 3 cm in size, which was confirmed on the mammogram and ultrasound.  On 11 she underwent laparoscopic cholecystectomy and right breast biopsy pathology revealed poorly differentiated invasive ductal carcinoma that was estrogen and progesterone receptor positive and HER-2/jovita negative.  ER was greater than 90% and LA was 90%.  HER-2/jovita was 1+ on immunohistochemistry.  On 11 she underwent right breast mastectomy, prophylactic left breast mastectomy and right axillary sentinel lymph node biopsy and axillary lymph node dissection.  Pathology examination revealed invasive ductal carcinoma with tumor measuring 3.2 cm.  It was poorly differentiated surgical margin  negative, one sentinel lymph node was positive with micrometastasis, a second sentinel and 16 additional lymph nodes were negative.  There was no lymphovascular invasion and she was consider to have T2N1A (stage IIB disease).  She was seen in consultation by Dr. MYAH Sawyer MD at the [facility] in [facility] on 11 using adjuvant online risk of recurrence over 10 years was estimated 66% and was thought that hormonal therapy would decrease her risk down to 39% and using adjuvant chemotherapy followed by adjuvant hormonal therapy would decrease the risk down to 27%.  Adjuvant chemotherapy with Adriamycin and  Cytoxan was given in a dose dense fraction every two weeks for four cycles followed by Taxotere given every three weeks for four treatments.  She completed four cycles of AC on 10/27/11 and received the first cycle of Taxotere on 11/17/11. She developed severe mucositis and worsening neuropathy after the first cycle of Taxotere and the dose was reduced by 10%.  Significant hand foot syndrome and mucositis developed after dose #2 and hence that she was changed to Taxol treatments every three weeks for the last two doses.  The first of two doses was given on 12/29/11 and the q. three week doses were chosen so that the patient has less neuropathy compared to the weekly doses.  Her ANC had dropped to 250 after the second Taxotere dose and hence she received Neulasta after 3 and 4 of Taxol.  Last dose of Taxol was given on 01/19/12 and the patient was started on Arimidex 1 mg p.o. daily on 02/08/12.  She had already been on calcium carbonate and vitamin D supplementation and DEXA scan on 02/02/12 had T-score of -1.2.  Infusaport was removed on 02/28/12.  She was having worsening low back pain and sciatica and was thought that Arimidex was possibly causing this and she was changed to Aromasin 25 mg daily on 05/07/12.  MRI of the lumbar spine was done on 05/15/12 revealing no evidence of metastatic disease.  There was probably ganglion cyst around the right acetabulum with benign appearance.  CT of the abdomen and pelvis on 10/10/12 revealed a simple renal cyst without evidence of metastatic disease due to worsening of DEXA scan.  On 02/04/14 with the T-score of -1.1.  A decision was made to change Aromasin to Tamoxifen.  A total of 10 years of hormonal therapy was planned to complete in February 20, 2022.  The patient apparently had borderline hypercalcemia with normal parathyroid hormone in April 2012, which subsequently resolved and has had epidural injections to the spine with limited success.  Her insurance has  changed and Dr. Kent was not having insurance plans and required her to change oncologist and hence she is seeing me today.   She has completed Tamoxifen 10 years duration of treatment in February 2022.  She has no evidence of disease  She is status post bilateral mastectomies no reconstruction     She subsequently developed tongue cancer in May 2023.  Patient had had a painful spot on the left portion of her tongue for 1 year.  She had a biopsy of the area which showed severe dysplasia with small focus of minimally invasive squamous cell carcinoma.  Patient has no history of smoking or drinking alcohol  She underwent left partial glossectomy in June 2023 the pathology was consistent with site of prior biopsy, no evidence of malignancy     She is established with Dr. Ulloa with [facility] and is followed in the [facility] multidisciplinary clinic every 6 months which will be due in April 2024        She has neuropathy from Taxotere chemotherapy therapy.  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 Ulloa       Past Surgical History:   Procedure Laterality Date    BACK SURGERY  03/2013    low back surgery    BREAST SURGERY  2011    CERVICAL DISC ARTHROPLASTY  09/2010    repair a disc in her neck     COLONOSCOPY  2012    LAPAROSCOPIC CHOLECYSTECTOMY  07/2011    MASTECTOMY Bilateral 08/2011    SPINE SURGERY  2010-neck, 2013-back         Current Outpatient  Medications:     ALPRAZolam (XANAX) 0.25 MG tablet, Take 1 tablet by mouth Daily As Needed for Anxiety., Disp: 30 tablet, Rfl: 0    ascorbic acid (VITAMIN C) 500 MG tablet, Take 1 tablet by mouth Daily., Disp: , Rfl:     Calcium Carb-Cholecalciferol (CALCIUM + D3) 600-200 MG-UNIT tablet, CALCIUM + D3 600-200 MG-UNIT TABS, Disp: , Rfl:     cyanocobalamin 100 MCG tablet, VITAMIN B12 TABS, Disp: , Rfl:     gabapentin (NEURONTIN) 300 MG capsule, TAKE ONE CAPSULE BY MOUTH THREE TIMES DAILY, Disp: 90 capsule, Rfl: 1    Multiple Vitamins-Minerals (CENTRUM SILVER ULTRA WOMENS) tablet, CENTRUM SILVER ULTRA WOMENS TABS, Disp: , Rfl:     vitamin B-6 (PYRIDOXINE) 100 MG tablet, VITAMIN B-6 100 MG TABS, Disp: , Rfl:     Allergies   Allergen Reactions    Bactrim [Sulfamethoxazole-Trimethoprim] Rash    Meperidine Nausea And Vomiting       Family History   Problem Relation Age of Onset    No Known Problems Other     Diabetes Mother          1998    Vision loss Mother         Lost eyesight from diabetes    Diabetes Father         Type 2 diabetes,  3/21/1997       Cancer-related family history is not on file.    Social History     Tobacco Use    Smoking status: Never     Passive exposure: Never    Smokeless tobacco: Never   Vaping Use    Vaping status: Never Used   Substance Use Topics    Alcohol use: Yes     Comment: glass of wine on occasion    Drug use: No       I have reviewed and confirmed the accuracy of the patient's history: Chief complaint, HPI, ROS, and Subjective as entered by the MA/LPN/RN. Inesmelivna Jensen MD 03/28/25  3/28/25    SUBJECTIVE:      She denies any new issues today    REVIEW OF SYSTEMS:    Review of Systems   Constitutional:  Negative for chills, fatigue and fever.   HENT:  Negative for congestion, drooling, ear discharge, rhinorrhea, sinus pressure and tinnitus.    Eyes:  Negative for photophobia, pain and discharge.   Respiratory:  Negative for apnea, choking and stridor.   "  Cardiovascular:  Negative for palpitations.   Gastrointestinal:  Negative for abdominal distention, abdominal pain and anal bleeding.   Endocrine: Negative for polydipsia and polyphagia.   Genitourinary:  Negative for decreased urine volume, flank pain and genital sores.   Musculoskeletal:  Negative for gait problem, neck pain and neck stiffness.   Skin:  Negative for color change, rash and wound.   Neurological:  Negative for tremors, seizures, syncope, facial asymmetry and speech difficulty.   Hematological:  Negative for adenopathy.   Psychiatric/Behavioral:  Negative for agitation, confusion, hallucinations and self-injury. The patient is not hyperactive.        OBJECTIVE:    Vitals:    03/28/25 1016   BP: 137/68   Pulse: 84   Resp: 18   Temp: 98.3 °F (36.8 °C)   TempSrc: Infrared   SpO2: 96%   Weight: 81.2 kg (179 lb)   Height: 167.6 cm (66\")   PainSc: 0-No pain     Body mass index is 28.89 kg/m².    ECOG  (0) Fully active, able to carry on all predisease performance without restriction    Physical Exam  Vitals and nursing note reviewed.   Constitutional:       General: She is not in acute distress.     Appearance: She is not diaphoretic.   HENT:      Head: Normocephalic and atraumatic.   Eyes:      General: No scleral icterus.        Right eye: No discharge.         Left eye: No discharge.      Conjunctiva/sclera: Conjunctivae normal.   Neck:      Thyroid: No thyromegaly.   Cardiovascular:      Rate and Rhythm: Normal rate and regular rhythm.      Heart sounds: Normal heart sounds.      No friction rub. No gallop.   Pulmonary:      Effort: Pulmonary effort is normal. No respiratory distress.      Breath sounds: No stridor. No wheezing.   Abdominal:      General: Bowel sounds are normal.      Palpations: Abdomen is soft. There is no mass.      Tenderness: There is no abdominal tenderness. There is no guarding or rebound.   Musculoskeletal:         General: No tenderness. Normal range of motion.      Cervical " back: Normal range of motion and neck supple.   Lymphadenopathy:      Cervical: No cervical adenopathy.   Skin:     General: Skin is warm.      Findings: No erythema or rash.   Neurological:      Mental Status: She is alert and oriented to person, place, and time.      Motor: No abnormal muscle tone.   Psychiatric:         Behavior: Behavior normal.     I have reexamined the patient and the results are consistent with the previously documented exam. Ines Jensen MD      RECENT LABS    WBC   Date Value Ref Range Status   03/28/2025 7.53 3.40 - 10.80 10*3/mm3 Final   06/05/2023 7.42 4.5 - 11.0 10*3/uL Final     RBC   Date Value Ref Range Status   03/28/2025 5.37 (H) 3.77 - 5.28 10*6/mm3 Final   06/05/2023 5.33 (H) 4.0 - 5.2 10*6/uL Final     Hemoglobin   Date Value Ref Range Status   03/28/2025 15.6 12.0 - 15.9 g/dL Final   06/05/2023 15.0 12.0 - 16.0 g/dL Final     Hematocrit   Date Value Ref Range Status   03/28/2025 47.8 (H) 34.0 - 46.6 % Final   06/05/2023 46.8 (H) 36.0 - 46.0 % Final     MCV   Date Value Ref Range Status   03/28/2025 89.0 79.0 - 97.0 fL Final   06/05/2023 87.8 80.0 - 100.0 fL Final     MCH   Date Value Ref Range Status   03/28/2025 29.1 26.6 - 33.0 pg Final   06/05/2023 28.1 26.0 - 34.0 pg Final     MCHC   Date Value Ref Range Status   03/28/2025 32.6 31.5 - 35.7 g/dL Final   06/05/2023 32.1 31.0 - 37.0 g/dL Final     RDW   Date Value Ref Range Status   03/28/2025 13.8 12.3 - 15.4 % Final   06/05/2023 13.2 12.0 - 16.8 % Final     RDW-SD   Date Value Ref Range Status   03/28/2025 44.2 37.0 - 54.0 fl Final     MPV   Date Value Ref Range Status   03/28/2025 11.5 6.0 - 12.0 fL Final   06/05/2023 10.3 8.4 - 12.4 fL Final     Platelets   Date Value Ref Range Status   03/28/2025 220 140 - 450 10*3/mm3 Final   06/05/2023 400 140 - 440 10*3/uL Final     Neutrophil Rel %   Date Value Ref Range Status   06/05/2023 54.8 45 - 80 % Final     Neutrophil %   Date Value Ref Range Status   03/28/2025  53.7 42.7 - 76.0 % Final     Lymphocyte Rel %   Date Value Ref Range Status   06/05/2023 34.6 15 - 50 % Final     Lymphocyte %   Date Value Ref Range Status   03/28/2025 31.6 19.6 - 45.3 % Final     Monocyte Rel %   Date Value Ref Range Status   06/05/2023 7.4 0 - 15 % Final     Monocyte %   Date Value Ref Range Status   03/28/2025 6.0 5.0 - 12.0 % Final     Eosinophil %   Date Value Ref Range Status   03/28/2025 8.2 (H) 0.3 - 6.2 % Final   06/05/2023 2.4 0 - 7 % Final     Basophil Rel %   Date Value Ref Range Status   06/05/2023 0.5 0 - 2 % Final     Basophil %   Date Value Ref Range Status   03/28/2025 0.5 0.0 - 1.5 % Final     Immature Grans %   Date Value Ref Range Status   06/05/2023 0.3 0.0 - 1.0 % Final     Neutrophils Absolute   Date Value Ref Range Status   06/05/2023 4.06 2.0 - 8.8 10*3/uL Final     Neutrophils, Absolute   Date Value Ref Range Status   03/28/2025 4.04 1.70 - 7.00 10*3/mm3 Final     Lymphocytes Absolute   Date Value Ref Range Status   06/05/2023 2.57 0.7 - 5.5 10*3/uL Final     Lymphocytes, Absolute   Date Value Ref Range Status   03/28/2025 2.38 0.70 - 3.10 10*3/mm3 Final     Monocytes Absolute   Date Value Ref Range Status   06/05/2023 0.55 0.0 - 1.7 10*3/uL Final     Monocytes, Absolute   Date Value Ref Range Status   03/28/2025 0.45 0.10 - 0.90 10*3/mm3 Final     Eosinophils Absolute   Date Value Ref Range Status   06/05/2023 0.18 0.0 - 0.8 10*3/uL Final     Eosinophils, Absolute   Date Value Ref Range Status   03/28/2025 0.62 (H) 0.00 - 0.40 10*3/mm3 Final     Basophils Absolute   Date Value Ref Range Status   06/05/2023 0.04 0.0 - 0.2 10*3/uL Final     Basophils, Absolute   Date Value Ref Range Status   03/28/2025 0.04 0.00 - 0.20 10*3/mm3 Final     Immature Grans, Absolute   Date Value Ref Range Status   06/05/2023 0.02 0.00 - 0.10 10*3/uL Final     nRBC   Date Value Ref Range Status   06/05/2023 0 0 /100(WBC) Final       Lab Results   Component Value Date    GLUCOSE 105 (H)  02/21/2025    BUN 20 02/21/2025    CREATININE 0.83 02/21/2025    EGFRIFNONA 81 11/22/2021    BCR 24.1 02/21/2025    K 4.4 02/21/2025    CO2 28.3 02/21/2025    CALCIUM 9.8 02/21/2025    ALBUMIN 4.3 02/21/2025    AST 24 02/21/2025    ALT 23 02/21/2025         Assessment & Plan     Malignant neoplasm of upper-outer quadrant of right breast in female, estrogen receptor positive  - CBC & Differential      ASSESSMENT:    Malignant neoplasm of upper-outer quadrant of right breast in female, estrogen receptor positive  - CBC & Differential  - Comprehensive Metabolic Panel     Neuropathy  - Vitamin B12  - Ambulatory Referral to Physical Therapy for Evaluation & Treatment     Poorly differentiated invasive ductal carcinoma of the right breast that was estrogen and progesterone receptor positive and HER-2/jovita negative.  ER was greater than 90% and [place] was 90%.  HER-2/jovita was 1+ negative .  Status post double mastectomy with right sentinel lymph node biopsy T2 N1 M0.  This was in 2011. No evidence of recurrence  Status post adjuvant chemotherapy with dose dense AC followed by Taxotere.  This was followed by endocrine therapy for 10 years patient could not tolerate Aromasin and Arimidex and completed 10 years of tamoxifen in February 2022. reviewed  Status post bilateral mastectomies no reconstruction. 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  Seen by Dr. eDlgado with [facility] every 6 months.  June 2023 was diagnosis  Osteoporosis : On prolia last given Feb 2024. Bone density is due for July 2024. Patient is on vitamin  and calcium.  Now showing osteopenia only.  Counseled on continuing calcium and vitamin D supplementation and weightbearing exercise for 20 to 30 minutes most days of the week. She will fu with her PCP  Family history of breast cancer in her sister 70s, cousin with breast cancer in her 60s. She will be  referred to the genetic counselor        Plans     Will get additional oncology records from Dr. Fernandez's office  schedule referal to genetic counselor, fu in one year   Physical therapy referral for peripheral neuropathy  Patient to fu with her pcp for managing   Bone density due July 2026  FU in one year or earlier prn problems  All questions answered     Patient verbalized understanding and is in agreement of the above plan.           Electronically signed by Ines Jensen MD, 03/28/25, 2:35 PM EDT.

## 2025-04-17 RX ORDER — GABAPENTIN 300 MG/1
300 CAPSULE ORAL 3 TIMES DAILY
Qty: 90 CAPSULE | Refills: 1 | Status: SHIPPED | OUTPATIENT
Start: 2025-04-17

## 2025-04-17 NOTE — TELEPHONE ENCOUNTER
Caller: Edie Byrnes    Relationship: Self    Best call back number: [phone]     Requested Prescriptions:   Requested Prescriptions     Pending Prescriptions Disp Refills    gabapentin (NEURONTIN) 300 MG capsule 90 capsule 1     Sig: Take 1 capsule by mouth 3 (Three) Times a Day.        Pharmacy where request should be sent: [id number] [address] [facility] [phone] [facility] [phone]      Last office visit with prescribing clinician: 2/21/2025   Last telemedicine visit with prescribing clinician: Visit date not found   Next office visit with prescribing clinician: 2/27/2026     Additional details provided by patient: PATIENT HAS ABOUT 5 DAYS OF MEDICINE LEFT    Does the patient have less than a 3 day supply:  [] Yes  [x] No        Edgar Medel Rep   04/17/25 08:47 EDT

## 2025-04-25 RX ORDER — GABAPENTIN 300 MG/1
300 CAPSULE ORAL 3 TIMES DAILY
Qty: 90 CAPSULE | Refills: 1 | OUTPATIENT
Start: 2025-04-25

## 2025-07-01 RX ORDER — GABAPENTIN 300 MG/1
300 CAPSULE ORAL 3 TIMES DAILY
Qty: 90 CAPSULE | Refills: 1 | Status: SHIPPED | OUTPATIENT
Start: 2025-07-01

## 2025-07-01 NOTE — TELEPHONE ENCOUNTER
Caller: Edie Byrnes    Relationship: Self    Best call back number: [phone]    Requested Prescriptions:   Requested Prescriptions     Pending Prescriptions Disp Refills    gabapentin (NEURONTIN) 300 MG capsule 90 capsule 1     Sig: Take 1 capsule by mouth 3 (Three) Times a Day.        Pharmacy where request should be sent: [id number] [address] [phone] [facility] [phone]      Last office visit with prescribing clinician: 2/21/2025   Last telemedicine visit with prescribing clinician: Visit date not found   Next office visit with prescribing clinician: 2/27/2026     Additional details provided by patient:     Does the patient have less than a 3 day supply:  [x] Yes  [] No      Edgar García Rep   07/01/25 14:52 EDT

## 2025-08-29 RX ORDER — GABAPENTIN 300 MG/1
300 CAPSULE ORAL 3 TIMES DAILY
Qty: 90 CAPSULE | Refills: 1 | Status: SHIPPED | OUTPATIENT
Start: 2025-08-29